# Patient Record
Sex: FEMALE | Race: WHITE | NOT HISPANIC OR LATINO | Employment: FULL TIME | URBAN - METROPOLITAN AREA
[De-identification: names, ages, dates, MRNs, and addresses within clinical notes are randomized per-mention and may not be internally consistent; named-entity substitution may affect disease eponyms.]

---

## 2017-01-10 ENCOUNTER — TELEPHONE (OUTPATIENT)
Dept: OBSTETRICS AND GYNECOLOGY | Facility: CLINIC | Age: 22
End: 2017-01-10

## 2017-01-10 NOTE — TELEPHONE ENCOUNTER
----- Message from Freya Frias sent at 1/10/2017  2:09 PM CST -----  Contact: self, [phone]  Patient called in requesting to speak with you regarding getting her medical records from her previous doctor.  Please advise.

## 2017-01-10 NOTE — TELEPHONE ENCOUNTER
Pt called stating she is switching to Dr. Wiedemann from Dr. Quintana and she wanted to know if she needed to cancel her appt's with her or if we would do it. Pt notified that she would need to call and cancel them. Pt verbalized understanding

## 2017-01-12 ENCOUNTER — ROUTINE PRENATAL (OUTPATIENT)
Dept: OBSTETRICS AND GYNECOLOGY | Facility: CLINIC | Age: 22
End: 2017-01-12
Payer: COMMERCIAL

## 2017-01-12 VITALS
WEIGHT: 113.13 LBS | DIASTOLIC BLOOD PRESSURE: 74 MMHG | BODY MASS INDEX: 18.25 KG/M2 | SYSTOLIC BLOOD PRESSURE: 102 MMHG

## 2017-01-12 DIAGNOSIS — N91.2 AMENORRHEA: Primary | ICD-10-CM

## 2017-01-12 PROCEDURE — 0502F SUBSEQUENT PRENATAL CARE: CPT | Mod: S$GLB,,, | Performed by: OBSTETRICS & GYNECOLOGY

## 2017-01-12 PROCEDURE — 99999 PR PBB SHADOW E&M-EST. PATIENT-LVL II: CPT | Mod: PBBFAC,,, | Performed by: OBSTETRICS & GYNECOLOGY

## 2017-01-12 NOTE — PROGRESS NOTES
Pt is 10 weeks by lmp, had us last week, results pending, no problems this preg so far  Labs normal  abd soft, viable on bedside us, wi

## 2017-01-19 ENCOUNTER — TELEPHONE (OUTPATIENT)
Dept: OBSTETRICS AND GYNECOLOGY | Facility: CLINIC | Age: 22
End: 2017-01-19

## 2017-01-19 NOTE — TELEPHONE ENCOUNTER
Returned patients call.   Patient notified to use otc sudafed for her sinus. Verbalized understanding.

## 2017-01-19 NOTE — TELEPHONE ENCOUNTER
----- Message from Maile Hull sent at 1/19/2017 12:25 PM CST -----  Patient no. [phone]     Patient returned your call.

## 2017-01-19 NOTE — TELEPHONE ENCOUNTER
----- Message from Gianna Morocho sent at 1/19/2017 11:35 AM CST -----  Contact: [phone]  Patient would like to speak with you regarding headaches

## 2017-02-07 ENCOUNTER — TELEPHONE (OUTPATIENT)
Dept: OBSTETRICS AND GYNECOLOGY | Facility: CLINIC | Age: 22
End: 2017-02-07

## 2017-02-07 NOTE — TELEPHONE ENCOUNTER
Pt states she has a cold and she wants to know what she can take. Pt advised to take tylenol cold/sinus, robitussin DM and use chloraseptic spray as needed. Pt states she does not have fever. Pt advised to call us back if she gets a fever. Pt verbalized understanding

## 2017-02-07 NOTE — TELEPHONE ENCOUNTER
----- Message from Chuyita Mercedes sent at 2/7/2017  8:31 AM CST -----  Contact: Self [phone]  Patient is calling to talk to nurse she is coming down with a cold and she is 14 weeks pregnant and she has been taking sudafed and is not helping and would like doctors advice.

## 2017-02-13 ENCOUNTER — ROUTINE PRENATAL (OUTPATIENT)
Dept: OBSTETRICS AND GYNECOLOGY | Facility: CLINIC | Age: 22
End: 2017-02-13
Payer: COMMERCIAL

## 2017-02-13 VITALS — DIASTOLIC BLOOD PRESSURE: 62 MMHG | WEIGHT: 121.5 LBS | SYSTOLIC BLOOD PRESSURE: 108 MMHG | BODY MASS INDEX: 19.61 KG/M2

## 2017-02-13 DIAGNOSIS — N91.2 AMENORRHEA: Primary | ICD-10-CM

## 2017-02-13 PROCEDURE — 99999 PR PBB SHADOW E&M-EST. PATIENT-LVL II: CPT | Mod: PBBFAC,,, | Performed by: OBSTETRICS & GYNECOLOGY

## 2017-02-13 PROCEDURE — 0502F SUBSEQUENT PRENATAL CARE: CPT | Mod: S$GLB,,, | Performed by: OBSTETRICS & GYNECOLOGY

## 2017-02-20 ENCOUNTER — TELEPHONE (OUTPATIENT)
Dept: OBSTETRICS AND GYNECOLOGY | Facility: CLINIC | Age: 22
End: 2017-02-20

## 2017-02-20 ENCOUNTER — LAB VISIT (OUTPATIENT)
Dept: LAB | Facility: HOSPITAL | Age: 22
End: 2017-02-20
Attending: OBSTETRICS & GYNECOLOGY
Payer: COMMERCIAL

## 2017-02-20 DIAGNOSIS — Z34.90 PREGNANCY, UNSPECIFIED GESTATIONAL AGE: ICD-10-CM

## 2017-02-20 DIAGNOSIS — Z34.90 PREGNANCY, UNSPECIFIED GESTATIONAL AGE: Primary | ICD-10-CM

## 2017-02-20 LAB — GLUCOSE SERPL-MCNC: 81 MG/DL

## 2017-02-20 PROCEDURE — 36415 COLL VENOUS BLD VENIPUNCTURE: CPT

## 2017-02-20 PROCEDURE — 82950 GLUCOSE TEST: CPT

## 2017-02-20 NOTE — TELEPHONE ENCOUNTER
----- Message from Chuyita Mercedes sent at 2/20/2017  1:43 PM CST -----  Contact: Self [phone]  Patient is downstairs and would like the orders for her glucose test she would like to do it today. Please advice

## 2017-02-21 ENCOUNTER — TELEPHONE (OUTPATIENT)
Dept: OBSTETRICS AND GYNECOLOGY | Facility: CLINIC | Age: 22
End: 2017-02-21

## 2017-02-21 NOTE — TELEPHONE ENCOUNTER
----- Message from Sherrie Brown sent at 2/21/2017  1:45 PM CST -----  Contact: [phone]/makayla with [facility]   Ms Honeycuttderrick its requesting to speak to a nurse in regarding this pt. Please advise

## 2017-03-06 ENCOUNTER — TELEPHONE (OUTPATIENT)
Dept: OBSTETRICS AND GYNECOLOGY | Facility: CLINIC | Age: 22
End: 2017-03-06

## 2017-03-06 NOTE — TELEPHONE ENCOUNTER
Pt states that she was at [facility] Friday and she got faint and lightheaded and then blacked out for a minute. Pt states that this used to happen often and she was told she is hypoglycemic. Pt did not fall, she sat down right before the episode. She wants to know if she needs to do anything or get checked.

## 2017-03-06 NOTE — TELEPHONE ENCOUNTER
----- Message from Gianna Morocho sent at 3/6/2017  9:31 AM CST -----  Contact: [phone]  Patient is requesting is to speak with the nurse, patient did not elaborate

## 2017-03-13 ENCOUNTER — ROUTINE PRENATAL (OUTPATIENT)
Dept: OBSTETRICS AND GYNECOLOGY | Facility: CLINIC | Age: 22
End: 2017-03-13
Payer: COMMERCIAL

## 2017-03-13 ENCOUNTER — OFFICE VISIT (OUTPATIENT)
Dept: OBSTETRICS AND GYNECOLOGY | Facility: CLINIC | Age: 22
End: 2017-03-13
Payer: COMMERCIAL

## 2017-03-13 VITALS
DIASTOLIC BLOOD PRESSURE: 62 MMHG | SYSTOLIC BLOOD PRESSURE: 100 MMHG | WEIGHT: 128.31 LBS | BODY MASS INDEX: 20.71 KG/M2

## 2017-03-13 DIAGNOSIS — Z36.3 ANTENATAL SCREENING FOR MALFORMATION USING ULTRASONICS: Primary | ICD-10-CM

## 2017-03-13 DIAGNOSIS — N91.2 AMENORRHEA: Primary | ICD-10-CM

## 2017-03-13 PROCEDURE — 0502F SUBSEQUENT PRENATAL CARE: CPT | Mod: S$GLB,,, | Performed by: OBSTETRICS & GYNECOLOGY

## 2017-03-13 PROCEDURE — 76805 OB US >/= 14 WKS SNGL FETUS: CPT | Mod: S$GLB,,, | Performed by: OBSTETRICS & GYNECOLOGY

## 2017-03-13 PROCEDURE — 99999 PR PBB SHADOW E&M-EST. PATIENT-LVL II: CPT | Mod: PBBFAC,,, | Performed by: OBSTETRICS & GYNECOLOGY

## 2017-04-13 ENCOUNTER — ROUTINE PRENATAL (OUTPATIENT)
Dept: OBSTETRICS AND GYNECOLOGY | Facility: CLINIC | Age: 22
End: 2017-04-13
Payer: COMMERCIAL

## 2017-04-13 ENCOUNTER — LAB VISIT (OUTPATIENT)
Dept: LAB | Facility: HOSPITAL | Age: 22
End: 2017-04-13
Attending: OBSTETRICS & GYNECOLOGY
Payer: COMMERCIAL

## 2017-04-13 VITALS — WEIGHT: 136.88 LBS | BODY MASS INDEX: 22.1 KG/M2

## 2017-04-13 DIAGNOSIS — N91.2 AMENORRHEA: Primary | ICD-10-CM

## 2017-04-13 DIAGNOSIS — N91.2 AMENORRHEA: ICD-10-CM

## 2017-04-13 PROCEDURE — 81220 CFTR GENE COM VARIANTS: CPT

## 2017-04-13 PROCEDURE — 36415 COLL VENOUS BLD VENIPUNCTURE: CPT

## 2017-04-13 PROCEDURE — 0502F SUBSEQUENT PRENATAL CARE: CPT | Mod: S$GLB,,, | Performed by: OBSTETRICS & GYNECOLOGY

## 2017-04-13 PROCEDURE — 99999 PR PBB SHADOW E&M-EST. PATIENT-LVL I: CPT | Mod: PBBFAC,,, | Performed by: OBSTETRICS & GYNECOLOGY

## 2017-04-17 LAB — CFTR MUT ANL BLD/T: NORMAL

## 2017-05-11 ENCOUNTER — ROUTINE PRENATAL (OUTPATIENT)
Dept: OBSTETRICS AND GYNECOLOGY | Facility: CLINIC | Age: 22
End: 2017-05-11
Payer: COMMERCIAL

## 2017-05-11 VITALS — DIASTOLIC BLOOD PRESSURE: 70 MMHG | WEIGHT: 140.63 LBS | BODY MASS INDEX: 22.7 KG/M2 | SYSTOLIC BLOOD PRESSURE: 116 MMHG

## 2017-05-11 DIAGNOSIS — N91.2 AMENORRHEA: Primary | ICD-10-CM

## 2017-05-11 PROCEDURE — 0502F SUBSEQUENT PRENATAL CARE: CPT | Mod: S$GLB,,, | Performed by: OBSTETRICS & GYNECOLOGY

## 2017-05-11 PROCEDURE — 99999 PR PBB SHADOW E&M-EST. PATIENT-LVL II: CPT | Mod: PBBFAC,,, | Performed by: OBSTETRICS & GYNECOLOGY

## 2017-05-18 ENCOUNTER — TELEPHONE (OUTPATIENT)
Dept: OBSTETRICS AND GYNECOLOGY | Facility: CLINIC | Age: 22
End: 2017-05-18

## 2017-05-18 NOTE — TELEPHONE ENCOUNTER
Returned patients call.   Notified patient office will mail information as requested. Patient verbalized understanding.

## 2017-05-18 NOTE — TELEPHONE ENCOUNTER
----- Message from Sis Machuca sent at 5/18/2017 10:45 AM CDT -----  Contact: Self/[phone]  Patient said she is sending a Physician Statement for her maternity leave for you to fill out and send to address provided. Please advise

## 2017-06-08 ENCOUNTER — ROUTINE PRENATAL (OUTPATIENT)
Dept: OBSTETRICS AND GYNECOLOGY | Facility: CLINIC | Age: 22
End: 2017-06-08
Payer: COMMERCIAL

## 2017-06-08 ENCOUNTER — LAB VISIT (OUTPATIENT)
Dept: LAB | Facility: HOSPITAL | Age: 22
End: 2017-06-08
Attending: OBSTETRICS & GYNECOLOGY
Payer: COMMERCIAL

## 2017-06-08 VITALS
WEIGHT: 148.81 LBS | SYSTOLIC BLOOD PRESSURE: 104 MMHG | BODY MASS INDEX: 24.02 KG/M2 | DIASTOLIC BLOOD PRESSURE: 62 MMHG

## 2017-06-08 DIAGNOSIS — N91.2 AMENORRHEA: ICD-10-CM

## 2017-06-08 DIAGNOSIS — N91.2 AMENORRHEA: Primary | ICD-10-CM

## 2017-06-08 LAB — GLUCOSE SERPL-MCNC: 133 MG/DL

## 2017-06-08 PROCEDURE — 36415 COLL VENOUS BLD VENIPUNCTURE: CPT

## 2017-06-08 PROCEDURE — 99999 PR PBB SHADOW E&M-EST. PATIENT-LVL II: CPT | Mod: PBBFAC,,, | Performed by: OBSTETRICS & GYNECOLOGY

## 2017-06-08 PROCEDURE — 0502F SUBSEQUENT PRENATAL CARE: CPT | Mod: S$GLB,,, | Performed by: OBSTETRICS & GYNECOLOGY

## 2017-06-08 PROCEDURE — 82950 GLUCOSE TEST: CPT

## 2017-06-08 RX ORDER — CLOTRIMAZOLE AND BETAMETHASONE DIPROPIONATE 10; .64 MG/G; MG/G
CREAM TOPICAL 2 TIMES DAILY
Qty: 15 TUBE | Refills: 1 | Status: SHIPPED | OUTPATIENT
Start: 2017-06-08 | End: 2017-07-10

## 2017-06-22 ENCOUNTER — ROUTINE PRENATAL (OUTPATIENT)
Dept: OBSTETRICS AND GYNECOLOGY | Facility: CLINIC | Age: 22
End: 2017-06-22
Payer: COMMERCIAL

## 2017-06-22 VITALS — WEIGHT: 149.94 LBS | BODY MASS INDEX: 24.2 KG/M2 | SYSTOLIC BLOOD PRESSURE: 102 MMHG | DIASTOLIC BLOOD PRESSURE: 58 MMHG

## 2017-06-22 DIAGNOSIS — N91.2 AMENORRHEA: Primary | ICD-10-CM

## 2017-06-22 DIAGNOSIS — R30.0 DYSURIA: ICD-10-CM

## 2017-06-22 DIAGNOSIS — Z36.89 ENCOUNTER FOR ULTRASOUND TO CHECK FETAL GROWTH: ICD-10-CM

## 2017-06-22 PROCEDURE — 87088 URINE BACTERIA CULTURE: CPT

## 2017-06-22 PROCEDURE — 87077 CULTURE AEROBIC IDENTIFY: CPT

## 2017-06-22 PROCEDURE — 87086 URINE CULTURE/COLONY COUNT: CPT

## 2017-06-22 PROCEDURE — 0502F SUBSEQUENT PRENATAL CARE: CPT | Mod: S$GLB,,, | Performed by: OBSTETRICS & GYNECOLOGY

## 2017-06-22 PROCEDURE — 87186 SC STD MICRODIL/AGAR DIL: CPT

## 2017-06-22 PROCEDURE — 99999 PR PBB SHADOW E&M-EST. PATIENT-LVL II: CPT | Mod: PBBFAC,,, | Performed by: OBSTETRICS & GYNECOLOGY

## 2017-06-24 ENCOUNTER — HOSPITAL ENCOUNTER (OUTPATIENT)
Facility: HOSPITAL | Age: 22
Discharge: HOME | End: 2017-06-25
Attending: OBSTETRICS & GYNECOLOGY | Admitting: OBSTETRICS & GYNECOLOGY
Payer: COMMERCIAL

## 2017-06-24 DIAGNOSIS — N12 PYELONEPHRITIS: Primary | ICD-10-CM

## 2017-06-24 DIAGNOSIS — R10.9 LEFT SIDED ABDOMINAL PAIN: ICD-10-CM

## 2017-06-24 LAB
ALBUMIN SERPL BCP-MCNC: 2.6 G/DL
ALP SERPL-CCNC: 74 U/L
ALT SERPL W/O P-5'-P-CCNC: 11 U/L
ANION GAP SERPL CALC-SCNC: 10 MMOL/L
AST SERPL-CCNC: 14 U/L
BACTERIA #/AREA URNS HPF: ABNORMAL /HPF
BASOPHILS # BLD AUTO: 0.03 K/UL
BASOPHILS NFR BLD: 0.2 %
BILIRUB SERPL-MCNC: 0.4 MG/DL
BILIRUB UR QL STRIP: NEGATIVE
BUN SERPL-MCNC: 9 MG/DL
CALCIUM SERPL-MCNC: 8.6 MG/DL
CHLORIDE SERPL-SCNC: 107 MMOL/L
CLARITY UR: CLEAR
CO2 SERPL-SCNC: 20 MMOL/L
COLOR UR: YELLOW
CREAT SERPL-MCNC: 0.7 MG/DL
DIFFERENTIAL METHOD: ABNORMAL
EOSINOPHIL # BLD AUTO: 0 K/UL
EOSINOPHIL NFR BLD: 0.1 %
ERYTHROCYTE [DISTWIDTH] IN BLOOD BY AUTOMATED COUNT: 12.8 %
EST. GFR  (AFRICAN AMERICAN): >60 ML/MIN/1.73 M^2
EST. GFR  (NON AFRICAN AMERICAN): >60 ML/MIN/1.73 M^2
GLUCOSE SERPL-MCNC: 101 MG/DL
GLUCOSE UR QL STRIP: NEGATIVE
HCT VFR BLD AUTO: 29.6 %
HGB BLD-MCNC: 10.3 G/DL
HGB UR QL STRIP: ABNORMAL
KETONES UR QL STRIP: ABNORMAL
LEUKOCYTE ESTERASE UR QL STRIP: ABNORMAL
LYMPHOCYTES # BLD AUTO: 0.8 K/UL
LYMPHOCYTES NFR BLD: 5.8 %
MCH RBC QN AUTO: 31.9 PG
MCHC RBC AUTO-ENTMCNC: 34.8 %
MCV RBC AUTO: 92 FL
MICROSCOPIC COMMENT: ABNORMAL
MONOCYTES # BLD AUTO: 1.7 K/UL
MONOCYTES NFR BLD: 11.9 %
NEUTROPHILS # BLD AUTO: 11.5 K/UL
NEUTROPHILS NFR BLD: 80.4 %
NITRITE UR QL STRIP: NEGATIVE
PH UR STRIP: 7 [PH] (ref 5–8)
PLATELET # BLD AUTO: 135 K/UL
PMV BLD AUTO: 10 FL
POTASSIUM SERPL-SCNC: 3.8 MMOL/L
PROT SERPL-MCNC: 5.7 G/DL
PROT UR QL STRIP: ABNORMAL
RBC # BLD AUTO: 3.23 M/UL
RBC #/AREA URNS HPF: 40 /HPF (ref 0–4)
SODIUM SERPL-SCNC: 137 MMOL/L
SP GR UR STRIP: 1.01 (ref 1–1.03)
SQUAMOUS #/AREA URNS HPF: 2 /HPF
URN SPEC COLLECT METH UR: ABNORMAL
UROBILINOGEN UR STRIP-ACNC: NEGATIVE EU/DL
WBC # BLD AUTO: 14.32 K/UL
WBC #/AREA URNS HPF: 3 /HPF (ref 0–5)

## 2017-06-24 PROCEDURE — 63600175 PHARM REV CODE 636 W HCPCS: Performed by: OBSTETRICS & GYNECOLOGY

## 2017-06-24 PROCEDURE — 96361 HYDRATE IV INFUSION ADD-ON: CPT

## 2017-06-24 PROCEDURE — 59025 FETAL NON-STRESS TEST: CPT | Mod: 26,,, | Performed by: OBSTETRICS & GYNECOLOGY

## 2017-06-24 PROCEDURE — 36415 COLL VENOUS BLD VENIPUNCTURE: CPT

## 2017-06-24 PROCEDURE — 80053 COMPREHEN METABOLIC PANEL: CPT

## 2017-06-24 PROCEDURE — G0378 HOSPITAL OBSERVATION PER HR: HCPCS

## 2017-06-24 PROCEDURE — 96376 TX/PRO/DX INJ SAME DRUG ADON: CPT

## 2017-06-24 PROCEDURE — 59025 FETAL NON-STRESS TEST: CPT

## 2017-06-24 PROCEDURE — 85025 COMPLETE CBC W/AUTO DIFF WBC: CPT

## 2017-06-24 PROCEDURE — 96374 THER/PROPH/DIAG INJ IV PUSH: CPT

## 2017-06-24 PROCEDURE — 25000003 PHARM REV CODE 250: Performed by: OBSTETRICS & GYNECOLOGY

## 2017-06-24 PROCEDURE — 25000003 PHARM REV CODE 250

## 2017-06-24 PROCEDURE — 81000 URINALYSIS NONAUTO W/SCOPE: CPT

## 2017-06-24 PROCEDURE — 99214 OFFICE O/P EST MOD 30 MIN: CPT | Mod: 25,,, | Performed by: OBSTETRICS & GYNECOLOGY

## 2017-06-24 RX ORDER — ONDANSETRON 8 MG/1
8 TABLET, ORALLY DISINTEGRATING ORAL EVERY 8 HOURS PRN
Status: DISCONTINUED | OUTPATIENT
Start: 2017-06-24 | End: 2017-06-25 | Stop reason: HOSPADM

## 2017-06-24 RX ORDER — CEFAZOLIN SODIUM 1 G/50ML
1 SOLUTION INTRAVENOUS
Status: DISCONTINUED | OUTPATIENT
Start: 2017-06-24 | End: 2017-06-25 | Stop reason: HOSPADM

## 2017-06-24 RX ORDER — SODIUM CHLORIDE, SODIUM LACTATE, POTASSIUM CHLORIDE, CALCIUM CHLORIDE 600; 310; 30; 20 MG/100ML; MG/100ML; MG/100ML; MG/100ML
INJECTION, SOLUTION INTRAVENOUS CONTINUOUS
Status: DISCONTINUED | OUTPATIENT
Start: 2017-06-24 | End: 2017-06-25 | Stop reason: HOSPADM

## 2017-06-24 RX ORDER — ACETAMINOPHEN 500 MG
500 TABLET ORAL EVERY 6 HOURS PRN
Status: DISCONTINUED | OUTPATIENT
Start: 2017-06-24 | End: 2017-06-25 | Stop reason: HOSPADM

## 2017-06-24 RX ORDER — OXYCODONE AND ACETAMINOPHEN 10; 325 MG/1; MG/1
1 TABLET ORAL ONCE AS NEEDED
Status: COMPLETED | OUTPATIENT
Start: 2017-06-24 | End: 2017-06-24

## 2017-06-24 RX ORDER — OXYCODONE AND ACETAMINOPHEN 5; 325 MG/1; MG/1
1 TABLET ORAL EVERY 6 HOURS PRN
Status: DISCONTINUED | OUTPATIENT
Start: 2017-06-24 | End: 2017-06-25 | Stop reason: HOSPADM

## 2017-06-24 RX ORDER — CEFAZOLIN SODIUM 1 G/50ML
1 SOLUTION INTRAVENOUS ONCE
Status: COMPLETED | OUTPATIENT
Start: 2017-06-24 | End: 2017-06-24

## 2017-06-24 RX ORDER — OXYCODONE AND ACETAMINOPHEN 5; 325 MG/1; MG/1
TABLET ORAL
Status: COMPLETED
Start: 2017-06-24 | End: 2017-06-24

## 2017-06-24 RX ADMIN — ACETAMINOPHEN 500 MG: 500 TABLET ORAL at 04:06

## 2017-06-24 RX ADMIN — CEFAZOLIN SODIUM 1 G: 1 SOLUTION INTRAVENOUS at 07:06

## 2017-06-24 RX ADMIN — SODIUM CHLORIDE, SODIUM LACTATE, POTASSIUM CHLORIDE, AND CALCIUM CHLORIDE: .6; .31; .03; .02 INJECTION, SOLUTION INTRAVENOUS at 06:06

## 2017-06-24 RX ADMIN — CEFAZOLIN SODIUM 1 G: 1 SOLUTION INTRAVENOUS at 01:06

## 2017-06-24 RX ADMIN — SODIUM CHLORIDE, SODIUM LACTATE, POTASSIUM CHLORIDE, AND CALCIUM CHLORIDE 1000 ML: .6; .31; .03; .02 INJECTION, SOLUTION INTRAVENOUS at 04:06

## 2017-06-24 RX ADMIN — SODIUM CHLORIDE, SODIUM LACTATE, POTASSIUM CHLORIDE, AND CALCIUM CHLORIDE: .6; .31; .03; .02 INJECTION, SOLUTION INTRAVENOUS at 01:06

## 2017-06-24 RX ADMIN — OXYCODONE HYDROCHLORIDE AND ACETAMINOPHEN 1 TABLET: 5; 325 TABLET ORAL at 07:06

## 2017-06-24 RX ADMIN — OXYCODONE HYDROCHLORIDE AND ACETAMINOPHEN 1 TABLET: 10; 325 TABLET ORAL at 06:06

## 2017-06-24 RX ADMIN — CEFAZOLIN 1 G: 330 INJECTION, POWDER, FOR SOLUTION INTRAMUSCULAR; INTRAVENOUS at 06:06

## 2017-06-24 RX ADMIN — ONDANSETRON 8 MG: 8 TABLET, ORALLY DISINTEGRATING ORAL at 07:06

## 2017-06-24 RX ADMIN — ONDANSETRON 8 MG: 8 TABLET, ORALLY DISINTEGRATING ORAL at 06:06

## 2017-06-24 RX ADMIN — OXYCODONE HYDROCHLORIDE AND ACETAMINOPHEN 1 TABLET: 5; 325 TABLET ORAL at 11:06

## 2017-06-24 NOTE — H&P
Admit HP     Pt is 21 presents with L flank discomfort and fever, baby moving well, no lof or bleeding, no contractions. Pt has urine culture pending from Thursday, symptoms worsened.  Uncomplicated prenatal care with me, labs all fine.   PMH neg (pt states she had kidney infection 1-2 years ago)  PSH neg  Sh neg  Ob hx G1  Pe Vss, low temp  Gen pt appears in no distress, smiling  Head/neck nromal, perrla, supple  Chest clear  abd gravid, uterus and abdomen nontender, no masses  Soft without tenderness  Mild L cva tenderness, not acute  cx closed per rn  extr nromal, neurovascular intact, no edema    nst reactive with accelerations per protocol, no decesl orlabor  Labd, ua pos for uti  Other blood normal  Assessment   iup 33+ weeks, no labor  Uti, prob early pyelo  Plan, admit for iv antibiotics/monitoring

## 2017-06-25 VITALS
BODY MASS INDEX: 24.11 KG/M2 | SYSTOLIC BLOOD PRESSURE: 109 MMHG | TEMPERATURE: 98 F | HEIGHT: 66 IN | WEIGHT: 150 LBS | OXYGEN SATURATION: 99 % | DIASTOLIC BLOOD PRESSURE: 69 MMHG | RESPIRATION RATE: 20 BRPM | HEART RATE: 108 BPM

## 2017-06-25 PROBLEM — N12 PYELONEPHRITIS: Status: ACTIVE | Noted: 2017-06-25

## 2017-06-25 LAB
BASOPHILS # BLD AUTO: 0.02 K/UL
BASOPHILS NFR BLD: 0.2 %
DIFFERENTIAL METHOD: ABNORMAL
EOSINOPHIL # BLD AUTO: 0 K/UL
EOSINOPHIL NFR BLD: 0.1 %
ERYTHROCYTE [DISTWIDTH] IN BLOOD BY AUTOMATED COUNT: 12.6 %
HCT VFR BLD AUTO: 30.4 %
HGB BLD-MCNC: 10.4 G/DL
LYMPHOCYTES # BLD AUTO: 1.3 K/UL
LYMPHOCYTES NFR BLD: 9.9 %
MCH RBC QN AUTO: 31.6 PG
MCHC RBC AUTO-ENTMCNC: 34.2 %
MCV RBC AUTO: 92 FL
MONOCYTES # BLD AUTO: 1.3 K/UL
MONOCYTES NFR BLD: 10.1 %
NEUTROPHILS # BLD AUTO: 10.4 K/UL
NEUTROPHILS NFR BLD: 78 %
PLATELET # BLD AUTO: 136 K/UL
PMV BLD AUTO: 10.2 FL
RBC # BLD AUTO: 3.29 M/UL
WBC # BLD AUTO: 13.33 K/UL

## 2017-06-25 PROCEDURE — 63600175 PHARM REV CODE 636 W HCPCS: Performed by: OBSTETRICS & GYNECOLOGY

## 2017-06-25 PROCEDURE — 96375 TX/PRO/DX INJ NEW DRUG ADDON: CPT

## 2017-06-25 PROCEDURE — 99217 PR OBSERVATION CARE DISCHARGE: CPT | Mod: 25,,, | Performed by: OBSTETRICS & GYNECOLOGY

## 2017-06-25 PROCEDURE — 36415 COLL VENOUS BLD VENIPUNCTURE: CPT

## 2017-06-25 PROCEDURE — 96361 HYDRATE IV INFUSION ADD-ON: CPT

## 2017-06-25 PROCEDURE — 85025 COMPLETE CBC W/AUTO DIFF WBC: CPT

## 2017-06-25 PROCEDURE — 25000003 PHARM REV CODE 250: Performed by: OBSTETRICS & GYNECOLOGY

## 2017-06-25 PROCEDURE — 96366 THER/PROPH/DIAG IV INF ADDON: CPT

## 2017-06-25 PROCEDURE — 96360 HYDRATION IV INFUSION INIT: CPT

## 2017-06-25 PROCEDURE — 96376 TX/PRO/DX INJ SAME DRUG ADON: CPT

## 2017-06-25 PROCEDURE — 96368 THER/DIAG CONCURRENT INF: CPT

## 2017-06-25 RX ADMIN — ACETAMINOPHEN 500 MG: 500 TABLET ORAL at 12:06

## 2017-06-25 RX ADMIN — CEFAZOLIN SODIUM 1 G: 1 SOLUTION INTRAVENOUS at 06:06

## 2017-06-25 RX ADMIN — CEFAZOLIN SODIUM 1 G: 1 SOLUTION INTRAVENOUS at 05:06

## 2017-06-25 RX ADMIN — CEFAZOLIN SODIUM 1 G: 1 SOLUTION INTRAVENOUS at 12:06

## 2017-06-25 RX ADMIN — ACETAMINOPHEN 500 MG: 500 TABLET ORAL at 06:06

## 2017-06-25 RX ADMIN — ONDANSETRON 8 MG: 8 TABLET, ORALLY DISINTEGRATING ORAL at 06:06

## 2017-06-25 NOTE — DISCHARGE INSTRUCTIONS
Discharge home in stable condition. Dr. Wiedemann will call in AM to check on patient and prescribe oral antibiotics.

## 2017-06-25 NOTE — PROGRESS NOTES
Pt remains afebrile, feels much better, wants dc to home  Will give another dose iv ancef, then dc,   rx oral to get in am,

## 2017-06-25 NOTE — PROGRESS NOTES
Hd 2 for pyelo, L side,   Afebrile last pm, low temp now,  Still with discomfort L cva, otherwise ok  abd soft,   fht good, nst reactive with accelerations per protocol  Assessment;  Pyelonephritis  Plan, awaiting senstivities, plan on possible dc later on oral if remains afebrile, ??  Will re-evaluate later

## 2017-06-26 ENCOUNTER — TELEPHONE (OUTPATIENT)
Dept: OBSTETRICS AND GYNECOLOGY | Facility: CLINIC | Age: 22
End: 2017-06-26

## 2017-06-26 LAB — BACTERIA UR CULT: NORMAL

## 2017-06-26 RX ORDER — BUTALBITAL, ACETAMINOPHEN AND CAFFEINE 50; 325; 40 MG/1; MG/1; MG/1
1 TABLET ORAL EVERY 4 HOURS PRN
Qty: 15 TABLET | Refills: 0 | Status: SHIPPED | OUTPATIENT
Start: 2017-06-26 | End: 2017-07-10

## 2017-06-26 RX ORDER — BUTALBITAL, ACETAMINOPHEN AND CAFFEINE 50; 325; 40 MG/1; MG/1; MG/1
1 TABLET ORAL EVERY 4 HOURS PRN
Qty: 15 TABLET | Refills: 0 | Status: SHIPPED | OUTPATIENT
Start: 2017-06-26 | End: 2017-06-26 | Stop reason: SDUPTHER

## 2017-06-26 RX ORDER — CEPHALEXIN 500 MG/1
500 CAPSULE ORAL 4 TIMES DAILY
Qty: 20 CAPSULE | Refills: 1 | Status: SHIPPED | OUTPATIENT
Start: 2017-06-26 | End: 2017-07-06

## 2017-06-26 RX ORDER — CEPHALEXIN 500 MG/1
500 CAPSULE ORAL 4 TIMES DAILY
Qty: 20 CAPSULE | Refills: 1 | Status: SHIPPED | OUTPATIENT
Start: 2017-06-26 | End: 2017-06-26 | Stop reason: SDUPTHER

## 2017-06-26 NOTE — TELEPHONE ENCOUNTER
----- Message from Tuyet Schmitz sent at 6/26/2017  8:18 AM CDT -----  Contact: self/[phone]  She needs to speak to the doctor concerning a headache relating to her kidney issues.

## 2017-06-26 NOTE — TELEPHONE ENCOUNTER
Pt states she is having an extremely bad headache. She is crying as I am talking to her. The headache starts at the base of the skull on the L side. She says she can barely move her head.

## 2017-06-27 ENCOUNTER — TELEPHONE (OUTPATIENT)
Dept: OBSTETRICS AND GYNECOLOGY | Facility: CLINIC | Age: 22
End: 2017-06-27

## 2017-06-27 RX ORDER — NITROFURANTOIN 25; 75 MG/1; MG/1
100 CAPSULE ORAL 2 TIMES DAILY
Qty: 20 CAPSULE | Refills: 1 | Status: SHIPPED | OUTPATIENT
Start: 2017-06-27 | End: 2017-07-10

## 2017-06-27 NOTE — TELEPHONE ENCOUNTER
----- Message from Sherrie Brown sent at 6/27/2017  8:22 AM CDT -----  Contact: [phone]/ self   Patient called in returning your call. Please advise

## 2017-06-27 NOTE — TELEPHONE ENCOUNTER
I sent a nother antibiotic for her to take, ie macrobid, tell her to get this am and start,  Take bid,  (take 3 times today to get started then bid(  Thanks  [facility] pharmacy

## 2017-07-10 ENCOUNTER — OFFICE VISIT (OUTPATIENT)
Dept: OBSTETRICS AND GYNECOLOGY | Facility: CLINIC | Age: 22
End: 2017-07-10
Payer: COMMERCIAL

## 2017-07-10 ENCOUNTER — ROUTINE PRENATAL (OUTPATIENT)
Dept: OBSTETRICS AND GYNECOLOGY | Facility: CLINIC | Age: 22
End: 2017-07-10
Payer: COMMERCIAL

## 2017-07-10 VITALS — DIASTOLIC BLOOD PRESSURE: 68 MMHG | BODY MASS INDEX: 23.81 KG/M2 | WEIGHT: 147.5 LBS | SYSTOLIC BLOOD PRESSURE: 116 MMHG

## 2017-07-10 DIAGNOSIS — O26.849 UTERINE SIZE-DATE DISCREPANCY, ANTEPARTUM, UNSPECIFIED TRIMESTER: Primary | ICD-10-CM

## 2017-07-10 DIAGNOSIS — Z36.89 ENCOUNTER FOR ULTRASOUND TO CHECK FETAL GROWTH: ICD-10-CM

## 2017-07-10 DIAGNOSIS — Z36.85 ANTENATAL SCREENING FOR STREPTOCOCCUS B: Primary | ICD-10-CM

## 2017-07-10 PROCEDURE — 87081 CULTURE SCREEN ONLY: CPT

## 2017-07-10 PROCEDURE — 76816 OB US FOLLOW-UP PER FETUS: CPT | Mod: S$GLB,,, | Performed by: OBSTETRICS & GYNECOLOGY

## 2017-07-10 PROCEDURE — 99999 PR PBB SHADOW E&M-EST. PATIENT-LVL II: CPT | Mod: PBBFAC,,, | Performed by: OBSTETRICS & GYNECOLOGY

## 2017-07-10 PROCEDURE — 0502F SUBSEQUENT PRENATAL CARE: CPT | Mod: S$GLB,,, | Performed by: OBSTETRICS & GYNECOLOGY

## 2017-07-10 NOTE — PROGRESS NOTES
See us, all good, baby active, efw 5-12, feels same, sl less, vtx low, cx closed, strep done  Discussed acog no travel distance after 34 weeks  Make 2 appts,

## 2017-07-13 LAB — BACTERIA SPEC AEROBE CULT: NORMAL

## 2017-07-17 ENCOUNTER — ROUTINE PRENATAL (OUTPATIENT)
Dept: OBSTETRICS AND GYNECOLOGY | Facility: CLINIC | Age: 22
End: 2017-07-17
Payer: COMMERCIAL

## 2017-07-17 VITALS
BODY MASS INDEX: 24.66 KG/M2 | DIASTOLIC BLOOD PRESSURE: 62 MMHG | SYSTOLIC BLOOD PRESSURE: 108 MMHG | WEIGHT: 152.75 LBS

## 2017-07-17 DIAGNOSIS — Z3A.37 37 WEEKS GESTATION OF PREGNANCY: Primary | ICD-10-CM

## 2017-07-17 PROCEDURE — 0502F SUBSEQUENT PRENATAL CARE: CPT | Mod: S$GLB,,, | Performed by: OBSTETRICS & GYNECOLOGY

## 2017-07-17 PROCEDURE — 99999 PR PBB SHADOW E&M-EST. PATIENT-LVL II: CPT | Mod: PBBFAC,,, | Performed by: OBSTETRICS & GYNECOLOGY

## 2017-07-24 ENCOUNTER — HOSPITAL ENCOUNTER (OUTPATIENT)
Facility: HOSPITAL | Age: 22
Discharge: HOME | End: 2017-07-24
Attending: OBSTETRICS & GYNECOLOGY | Admitting: OBSTETRICS & GYNECOLOGY
Payer: COMMERCIAL

## 2017-07-24 VITALS
TEMPERATURE: 98 F | HEART RATE: 66 BPM | WEIGHT: 152 LBS | SYSTOLIC BLOOD PRESSURE: 118 MMHG | DIASTOLIC BLOOD PRESSURE: 79 MMHG | BODY MASS INDEX: 24.43 KG/M2 | HEIGHT: 66 IN | OXYGEN SATURATION: 100 %

## 2017-07-24 DIAGNOSIS — O26.899 ABDOMINAL PAIN COMPLICATING PREGNANCY: ICD-10-CM

## 2017-07-24 DIAGNOSIS — R10.9 ABDOMINAL PAIN COMPLICATING PREGNANCY: ICD-10-CM

## 2017-07-24 PROCEDURE — 99211 OFF/OP EST MAY X REQ PHY/QHP: CPT | Mod: 25

## 2017-07-24 PROCEDURE — 76815 OB US LIMITED FETUS(S): CPT

## 2017-07-24 PROCEDURE — G0378 HOSPITAL OBSERVATION PER HR: HCPCS

## 2017-07-24 PROCEDURE — 59025 FETAL NON-STRESS TEST: CPT | Mod: 26,,, | Performed by: OBSTETRICS & GYNECOLOGY

## 2017-07-24 PROCEDURE — 99213 OFFICE O/P EST LOW 20 MIN: CPT | Mod: 25,,, | Performed by: OBSTETRICS & GYNECOLOGY

## 2017-07-24 NOTE — PROGRESS NOTES
20 y/o G1 @ 38 weeks, presents to LD with poss lof and contractions. No bleeding or pain.  Pt has uncomplicated prenatal care.   Neg nitrozine per rn  Monitor shows irreg few contractions, no labor  cx closed per rn  Reactive nst with accelerations throughout, per protocol    Uterus soft  cx post closed by me  Neg nitro    Assessment, no evidenc lof  Us shows good fluid  Reactive nst, no labor  Plan, dc to home

## 2017-07-24 NOTE — PLAN OF CARE
Pt arrived to unit from ED with complaints of some period like cramping and two incidents yesterday of some leaking of fluid. Pt denies vaginal bleeding. States when she doesn feel contractions, they are still very spaced out. Pt accompanied by significant other. Changing into gown.    0815- dr. Wiedemann at bedside. Assessed pt. Bedside ultrasound completed. md confirmed plenty of amniotic fluid and visualized placenta. md also reviewed strip. sve done, amniotape also used with negative results. Pt and her SO understand that pt will be discharged home in 20 min after monitoring complete.

## 2017-07-25 ENCOUNTER — TELEPHONE (OUTPATIENT)
Dept: OBSTETRICS AND GYNECOLOGY | Facility: CLINIC | Age: 22
End: 2017-07-25

## 2017-07-25 NOTE — TELEPHONE ENCOUNTER
----- Message from Freya Altagracia sent at 7/25/2017  2:57 PM CDT -----  Contact: self, [phone]  Patient called in requesting to speak with you to know if she will need to come in.  Please advise.

## 2017-07-25 NOTE — TELEPHONE ENCOUNTER
Pt states she is feeling okay, she is really tired and sore. The contractions she is having are sporadic. She just wants to know if she needs to come in this week

## 2017-07-26 NOTE — TELEPHONE ENCOUNTER
Saw her Monday, just set up for nst anytime next week, and am going to induce her Monday 7th if not delivered.  :(

## 2017-07-26 NOTE — TELEPHONE ENCOUNTER
Pt has been scheduled for NST. She says today she woke up feeling pretty bad and her contractions are coming more regularly and they are hurting her more than they were before.

## 2017-08-01 ENCOUNTER — HOSPITAL ENCOUNTER (OUTPATIENT)
Dept: OBSTETRICS AND GYNECOLOGY | Facility: HOSPITAL | Age: 22
Discharge: HOME | End: 2017-08-01
Attending: OBSTETRICS & GYNECOLOGY
Payer: COMMERCIAL

## 2017-08-01 ENCOUNTER — TELEPHONE (OUTPATIENT)
Dept: OBSTETRICS AND GYNECOLOGY | Facility: CLINIC | Age: 22
End: 2017-08-01

## 2017-08-01 DIAGNOSIS — O36.8131 DECREASED FETAL MOVEMENT AFFECTING MANAGEMENT OF PREGNANCY IN THIRD TRIMESTER, FETUS 1: Primary | ICD-10-CM

## 2017-08-01 PROCEDURE — 59025 FETAL NON-STRESS TEST: CPT

## 2017-08-01 PROCEDURE — 59025 FETAL NON-STRESS TEST: CPT | Mod: 26,,, | Performed by: OBSTETRICS & GYNECOLOGY

## 2017-08-01 NOTE — TELEPHONE ENCOUNTER
----- Message from Kiesha Verdin sent at 8/1/2017 11:16 AM CDT -----  Contact: [phone]/self  Pt requesting to speak with the nurse concerning her delivery on next week.  Please advise

## 2017-08-03 ENCOUNTER — HOSPITAL ENCOUNTER (OUTPATIENT)
Facility: HOSPITAL | Age: 22
Discharge: HOME | End: 2017-08-03
Attending: OBSTETRICS & GYNECOLOGY | Admitting: OBSTETRICS & GYNECOLOGY
Payer: COMMERCIAL

## 2017-08-03 VITALS
HEART RATE: 70 BPM | TEMPERATURE: 99 F | DIASTOLIC BLOOD PRESSURE: 75 MMHG | RESPIRATION RATE: 20 BRPM | SYSTOLIC BLOOD PRESSURE: 123 MMHG

## 2017-08-03 DIAGNOSIS — R10.9 ABDOMINAL PAIN: ICD-10-CM

## 2017-08-03 PROCEDURE — 99211 OFF/OP EST MAY X REQ PHY/QHP: CPT

## 2017-08-03 PROCEDURE — G0378 HOSPITAL OBSERVATION PER HR: HCPCS

## 2017-08-03 PROCEDURE — 59025 FETAL NON-STRESS TEST: CPT

## 2017-08-03 NOTE — DISCHARGE INSTRUCTIONS
Drink 8-10 glasses water daily/ take prenatal vitamins daily/ eat light small meals frequently/ return if contractions every 3 min for good 2 hrs painful/ or if water bag breaks or if bright red vaginal bleeding/ if no labor before Aug 7 at 3am- come in for scheduled induction of labor

## 2017-08-03 NOTE — PLAN OF CARE
1250- Received to Triage#3 20 yo  at 39.3 weeks with chief c/o off and on pain all night- like menstrual pains. Assisted to bed in hospital gown. Ext FHT and UC monitoring initiated. FHT reassuring. Pt is having off and on mild palpable UC.   1300- SVE=closed soft/ mid-position cervix. No bldy show on exam glove.  1422- Assisted up to BR to void.   1500- SVE=same- closed soft mid position cervix- no bldy show on exam glove. Dr Novoa- on call for Dr Wiedemann notified. Report given. Advised pt can be given the opportunity to ambulate in hospital for 1 hr and get checked again- if no change will be discharged to home or take pain medication and go home now.   1518- Assisted up to ambulate in hallway.  1612- back to bed. Pt reports pain still at 6 out of 10 pain level.   1615- SVE- no change in cervix- closed soft mid-position cervix the patient prepared for discharge to home  1634- off monitoring / discharge instructions given/ pt to return if contractions become stronger and every 3 minutes or if water breaks or if bright red vaginal bleeding. Otherwise come in  for 3am for scheduled induction of labor.Pt and significant other voice understanding..

## 2017-08-04 ENCOUNTER — TELEPHONE (OUTPATIENT)
Dept: OBSTETRICS AND GYNECOLOGY | Facility: CLINIC | Age: 22
End: 2017-08-04

## 2017-08-04 NOTE — TELEPHONE ENCOUNTER
----- Message from Gianna Morocho sent at 8/4/2017  9:55 AM CDT -----  Contact: [phone]  Patient is returning your call

## 2017-08-06 DIAGNOSIS — Z34.93 ENCOUNTER FOR SUPERVISION OF NORMAL PREGNANCY IN THIRD TRIMESTER, UNSPECIFIED GRAVIDITY: Primary | ICD-10-CM

## 2017-08-06 RX ORDER — BUTORPHANOL TARTRATE 1 MG/ML
1 INJECTION INTRAMUSCULAR; INTRAVENOUS
Status: CANCELLED | OUTPATIENT
Start: 2017-08-06

## 2017-08-06 RX ORDER — OXYTOCIN/RINGER'S LACTATE 20/1000 ML
41.65 PLASTIC BAG, INJECTION (ML) INTRAVENOUS CONTINUOUS
Status: CANCELLED | OUTPATIENT
Start: 2017-08-06 | End: 2017-08-06

## 2017-08-06 RX ORDER — SODIUM CHLORIDE, SODIUM LACTATE, POTASSIUM CHLORIDE, CALCIUM CHLORIDE 600; 310; 30; 20 MG/100ML; MG/100ML; MG/100ML; MG/100ML
INJECTION, SOLUTION INTRAVENOUS CONTINUOUS
Status: CANCELLED | OUTPATIENT
Start: 2017-08-06

## 2017-08-06 RX ORDER — ONDANSETRON 4 MG/1
8 TABLET, ORALLY DISINTEGRATING ORAL EVERY 8 HOURS PRN
Status: CANCELLED | OUTPATIENT
Start: 2017-08-06

## 2017-08-06 RX ORDER — CARBOPROST TROMETHAMINE 250 UG/ML
250 INJECTION, SOLUTION INTRAMUSCULAR
Status: CANCELLED | OUTPATIENT
Start: 2017-08-06

## 2017-08-06 RX ORDER — METHYLERGONOVINE MALEATE 0.2 MG/ML
200 INJECTION INTRAVENOUS
Status: CANCELLED | OUTPATIENT
Start: 2017-08-06

## 2017-08-06 RX ORDER — OXYTOCIN/RINGER'S LACTATE 20/1000 ML
2 PLASTIC BAG, INJECTION (ML) INTRAVENOUS CONTINUOUS
Status: CANCELLED | OUTPATIENT
Start: 2017-08-06

## 2017-08-06 RX ORDER — MISOPROSTOL 100 UG/1
200 TABLET ORAL
Status: CANCELLED | OUTPATIENT
Start: 2017-08-06

## 2017-08-06 NOTE — ADDENDUM NOTE
Addended by: WIEDEMANN, MICHAEL A on: 8/6/2017 02:03 PM     Modules accepted: Magdalena, SmartSet

## 2017-08-07 ENCOUNTER — ANESTHESIA EVENT (OUTPATIENT)
Dept: OBSTETRICS AND GYNECOLOGY | Facility: HOSPITAL | Age: 22
End: 2017-08-07
Payer: COMMERCIAL

## 2017-08-07 ENCOUNTER — HOSPITAL ENCOUNTER (INPATIENT)
Facility: HOSPITAL | Age: 22
LOS: 2 days | Discharge: HOME | End: 2017-08-09
Attending: OBSTETRICS & GYNECOLOGY | Admitting: OBSTETRICS & GYNECOLOGY
Payer: COMMERCIAL

## 2017-08-07 ENCOUNTER — ANESTHESIA (OUTPATIENT)
Dept: OBSTETRICS AND GYNECOLOGY | Facility: HOSPITAL | Age: 22
End: 2017-08-07
Payer: COMMERCIAL

## 2017-08-07 DIAGNOSIS — Z34.93 ENCOUNTER FOR SUPERVISION OF NORMAL PREGNANCY IN THIRD TRIMESTER, UNSPECIFIED GRAVIDITY: ICD-10-CM

## 2017-08-07 LAB
ABO + RH BLD: NORMAL
BASOPHILS NFR BLD: 0 %
BLD GP AB SCN CELLS X3 SERPL QL: NORMAL
DIFFERENTIAL METHOD: ABNORMAL
EOSINOPHIL NFR BLD: 1 %
ERYTHROCYTE [DISTWIDTH] IN BLOOD BY AUTOMATED COUNT: 12.8 %
HCT VFR BLD AUTO: 32.2 %
HGB BLD-MCNC: 11.2 G/DL
LYMPHOCYTES NFR BLD: 19 %
MCH RBC QN AUTO: 31.5 PG
MCHC RBC AUTO-ENTMCNC: 34.8 G/DL
MCV RBC AUTO: 90 FL
METAMYELOCYTES NFR BLD MANUAL: 1 %
MONOCYTES NFR BLD: 8 %
NEUTROPHILS NFR BLD: 71 %
PLATELET # BLD AUTO: 179 K/UL
PLATELET BLD QL SMEAR: ABNORMAL
PMV BLD AUTO: 9.9 FL
RBC # BLD AUTO: 3.56 M/UL
RPR SER QL: NORMAL
WBC # BLD AUTO: 12.75 K/UL

## 2017-08-07 PROCEDURE — 85027 COMPLETE CBC AUTOMATED: CPT

## 2017-08-07 PROCEDURE — 86592 SYPHILIS TEST NON-TREP QUAL: CPT

## 2017-08-07 PROCEDURE — 86850 RBC ANTIBODY SCREEN: CPT

## 2017-08-07 PROCEDURE — 85007 BL SMEAR W/DIFF WBC COUNT: CPT

## 2017-08-07 PROCEDURE — 27800516 HC TRAY, EPIDURAL COMBO: Performed by: STUDENT IN AN ORGANIZED HEALTH CARE EDUCATION/TRAINING PROGRAM

## 2017-08-07 PROCEDURE — 25000003 PHARM REV CODE 250: Performed by: STUDENT IN AN ORGANIZED HEALTH CARE EDUCATION/TRAINING PROGRAM

## 2017-08-07 PROCEDURE — 62326 NJX INTERLAMINAR LMBR/SAC: CPT | Performed by: ANESTHESIOLOGY

## 2017-08-07 PROCEDURE — 51702 INSERT TEMP BLADDER CATH: CPT

## 2017-08-07 PROCEDURE — 11000001 HC ACUTE MED/SURG PRIVATE ROOM

## 2017-08-07 PROCEDURE — 72100002 HC LABOR CARE, 1ST 8 HOURS

## 2017-08-07 PROCEDURE — 3E033VJ INTRODUCTION OF OTHER HORMONE INTO PERIPHERAL VEIN, PERCUTANEOUS APPROACH: ICD-10-PCS | Performed by: OBSTETRICS & GYNECOLOGY

## 2017-08-07 PROCEDURE — S0028 INJECTION, FAMOTIDINE, 20 MG: HCPCS | Performed by: STUDENT IN AN ORGANIZED HEALTH CARE EDUCATION/TRAINING PROGRAM

## 2017-08-07 PROCEDURE — 0HQ9XZZ REPAIR PERINEUM SKIN, EXTERNAL APPROACH: ICD-10-PCS | Performed by: OBSTETRICS & GYNECOLOGY

## 2017-08-07 PROCEDURE — 72200005 HC VAGINAL DELIVERY LEVEL II

## 2017-08-07 PROCEDURE — 25000003 PHARM REV CODE 250: Performed by: OBSTETRICS & GYNECOLOGY

## 2017-08-07 PROCEDURE — 36415 COLL VENOUS BLD VENIPUNCTURE: CPT

## 2017-08-07 PROCEDURE — 59400 OBSTETRICAL CARE: CPT | Mod: GB,,, | Performed by: OBSTETRICS & GYNECOLOGY

## 2017-08-07 PROCEDURE — 10907ZC DRAINAGE OF AMNIOTIC FLUID, THERAPEUTIC FROM PRODUCTS OF CONCEPTION, VIA NATURAL OR ARTIFICIAL OPENING: ICD-10-PCS | Performed by: OBSTETRICS & GYNECOLOGY

## 2017-08-07 PROCEDURE — 59025 FETAL NON-STRESS TEST: CPT

## 2017-08-07 PROCEDURE — 27200710 HC EPIDURAL INFUSION PUMP SET: Performed by: STUDENT IN AN ORGANIZED HEALTH CARE EDUCATION/TRAINING PROGRAM

## 2017-08-07 PROCEDURE — 86900 BLOOD TYPING SEROLOGIC ABO: CPT

## 2017-08-07 RX ORDER — FAMOTIDINE 10 MG/ML
INJECTION INTRAVENOUS
Status: DISPENSED
Start: 2017-08-07 | End: 2017-08-07

## 2017-08-07 RX ORDER — ONDANSETRON 8 MG/1
8 TABLET, ORALLY DISINTEGRATING ORAL EVERY 8 HOURS PRN
Status: DISCONTINUED | OUTPATIENT
Start: 2017-08-07 | End: 2017-08-09 | Stop reason: HOSPADM

## 2017-08-07 RX ORDER — BUTORPHANOL TARTRATE 1 MG/ML
1 INJECTION INTRAMUSCULAR; INTRAVENOUS
Status: DISCONTINUED | OUTPATIENT
Start: 2017-08-07 | End: 2017-08-09 | Stop reason: HOSPADM

## 2017-08-07 RX ORDER — DIPHENHYDRAMINE HYDROCHLORIDE 50 MG/ML
12.5 INJECTION INTRAMUSCULAR; INTRAVENOUS EVERY 4 HOURS PRN
Status: CANCELLED | OUTPATIENT
Start: 2017-08-07

## 2017-08-07 RX ORDER — SODIUM CITRATE AND CITRIC ACID MONOHYDRATE 334; 500 MG/5ML; MG/5ML
30 SOLUTION ORAL ONCE AS NEEDED
Status: DISPENSED | OUTPATIENT
Start: 2017-08-07 | End: 2017-08-07

## 2017-08-07 RX ORDER — METHYLERGONOVINE MALEATE 0.2 MG/ML
200 INJECTION INTRAVENOUS
Status: DISCONTINUED | OUTPATIENT
Start: 2017-08-07 | End: 2017-08-09 | Stop reason: HOSPADM

## 2017-08-07 RX ORDER — METOCLOPRAMIDE HYDROCHLORIDE 5 MG/ML
10 INJECTION INTRAMUSCULAR; INTRAVENOUS ONCE AS NEEDED
Status: ACTIVE | OUTPATIENT
Start: 2017-08-07 | End: 2017-08-07

## 2017-08-07 RX ORDER — SODIUM CHLORIDE, SODIUM LACTATE, POTASSIUM CHLORIDE, CALCIUM CHLORIDE 600; 310; 30; 20 MG/100ML; MG/100ML; MG/100ML; MG/100ML
INJECTION, SOLUTION INTRAVENOUS CONTINUOUS
Status: DISCONTINUED | OUTPATIENT
Start: 2017-08-07 | End: 2017-08-09 | Stop reason: HOSPADM

## 2017-08-07 RX ORDER — ZOLPIDEM TARTRATE 5 MG/1
5 TABLET ORAL NIGHTLY PRN
Status: DISCONTINUED | OUTPATIENT
Start: 2017-08-07 | End: 2017-08-09 | Stop reason: HOSPADM

## 2017-08-07 RX ORDER — OXYTOCIN/RINGER'S LACTATE 20/1000 ML
41.65 PLASTIC BAG, INJECTION (ML) INTRAVENOUS CONTINUOUS
Status: ACTIVE | OUTPATIENT
Start: 2017-08-07 | End: 2017-08-07

## 2017-08-07 RX ORDER — OXYCODONE AND ACETAMINOPHEN 10; 325 MG/1; MG/1
1 TABLET ORAL EVERY 4 HOURS PRN
Status: DISCONTINUED | OUTPATIENT
Start: 2017-08-07 | End: 2017-08-09 | Stop reason: HOSPADM

## 2017-08-07 RX ORDER — CARBOPROST TROMETHAMINE 250 UG/ML
250 INJECTION, SOLUTION INTRAMUSCULAR
Status: DISCONTINUED | OUTPATIENT
Start: 2017-08-07 | End: 2017-08-09 | Stop reason: HOSPADM

## 2017-08-07 RX ORDER — OXYCODONE AND ACETAMINOPHEN 5; 325 MG/1; MG/1
1 TABLET ORAL EVERY 4 HOURS PRN
Status: DISCONTINUED | OUTPATIENT
Start: 2017-08-07 | End: 2017-08-09 | Stop reason: HOSPADM

## 2017-08-07 RX ORDER — HYDROCORTISONE 25 MG/G
CREAM TOPICAL 3 TIMES DAILY PRN
Status: DISCONTINUED | OUTPATIENT
Start: 2017-08-07 | End: 2017-08-09 | Stop reason: HOSPADM

## 2017-08-07 RX ORDER — DIPHENHYDRAMINE HCL 25 MG
25 CAPSULE ORAL EVERY 4 HOURS PRN
Status: DISCONTINUED | OUTPATIENT
Start: 2017-08-07 | End: 2017-08-09 | Stop reason: HOSPADM

## 2017-08-07 RX ORDER — SODIUM CITRATE AND CITRIC ACID MONOHYDRATE 334; 500 MG/5ML; MG/5ML
SOLUTION ORAL
Status: DISCONTINUED
Start: 2017-08-07 | End: 2017-08-07 | Stop reason: WASHOUT

## 2017-08-07 RX ORDER — NAPROXEN 500 MG/1
500 TABLET ORAL EVERY 8 HOURS PRN
Status: DISCONTINUED | OUTPATIENT
Start: 2017-08-07 | End: 2017-08-09 | Stop reason: HOSPADM

## 2017-08-07 RX ORDER — ONDANSETRON 2 MG/ML
4 INJECTION INTRAMUSCULAR; INTRAVENOUS ONCE
Status: CANCELLED | OUTPATIENT
Start: 2017-08-07 | End: 2017-08-07

## 2017-08-07 RX ORDER — FAMOTIDINE 10 MG/ML
20 INJECTION INTRAVENOUS ONCE AS NEEDED
Status: COMPLETED | OUTPATIENT
Start: 2017-08-07 | End: 2017-08-07

## 2017-08-07 RX ORDER — MISOPROSTOL 100 MCG
50 TABLET ORAL ONCE
Status: COMPLETED | OUTPATIENT
Start: 2017-08-07 | End: 2017-08-07

## 2017-08-07 RX ORDER — MISOPROSTOL 200 UG/1
200 TABLET ORAL
Status: DISCONTINUED | OUTPATIENT
Start: 2017-08-07 | End: 2017-08-09 | Stop reason: HOSPADM

## 2017-08-07 RX ORDER — OXYTOCIN/RINGER'S LACTATE 20/1000 ML
2 PLASTIC BAG, INJECTION (ML) INTRAVENOUS CONTINUOUS
Status: DISCONTINUED | OUTPATIENT
Start: 2017-08-07 | End: 2017-08-09 | Stop reason: HOSPADM

## 2017-08-07 RX ORDER — DIPHENHYDRAMINE HYDROCHLORIDE 50 MG/ML
25 INJECTION INTRAMUSCULAR; INTRAVENOUS EVERY 4 HOURS PRN
Status: DISCONTINUED | OUTPATIENT
Start: 2017-08-07 | End: 2017-08-09 | Stop reason: HOSPADM

## 2017-08-07 RX ORDER — ACETAMINOPHEN 325 MG/1
650 TABLET ORAL EVERY 6 HOURS PRN
Status: DISCONTINUED | OUTPATIENT
Start: 2017-08-07 | End: 2017-08-09 | Stop reason: HOSPADM

## 2017-08-07 RX ADMIN — Medication 2 MILLI-UNITS/MIN: at 08:08

## 2017-08-07 RX ADMIN — Medication 12 ML/HR: at 08:08

## 2017-08-07 RX ADMIN — NAPROXEN 500 MG: 500 TABLET ORAL at 04:08

## 2017-08-07 RX ADMIN — Medication 50 MCG: at 03:08

## 2017-08-07 RX ADMIN — SODIUM CHLORIDE, SODIUM LACTATE, POTASSIUM CHLORIDE, AND CALCIUM CHLORIDE: .6; .31; .03; .02 INJECTION, SOLUTION INTRAVENOUS at 03:08

## 2017-08-07 RX ADMIN — SODIUM CHLORIDE, SODIUM LACTATE, POTASSIUM CHLORIDE, AND CALCIUM CHLORIDE: .6; .31; .03; .02 INJECTION, SOLUTION INTRAVENOUS at 11:08

## 2017-08-07 RX ADMIN — ONDANSETRON 8 MG: 8 TABLET, ORALLY DISINTEGRATING ORAL at 12:08

## 2017-08-07 RX ADMIN — SODIUM CHLORIDE, SODIUM LACTATE, POTASSIUM CHLORIDE, AND CALCIUM CHLORIDE 1000 ML: .6; .31; .03; .02 INJECTION, SOLUTION INTRAVENOUS at 07:08

## 2017-08-07 RX ADMIN — FAMOTIDINE 20 MG: 10 INJECTION, SOLUTION INTRAVENOUS at 10:08

## 2017-08-07 RX ADMIN — ONDANSETRON 8 MG: 8 TABLET, ORALLY DISINTEGRATING ORAL at 09:08

## 2017-08-07 RX ADMIN — OXYCODONE AND ACETAMINOPHEN 1 TABLET: 5; 325 TABLET ORAL at 09:08

## 2017-08-07 NOTE — H&P
22 y/o G1 at term for induction, uncomplicated prenatal care  pmh neg  Psh neg  All none  Sh neg  pe vss  Head/neck normal  abd gravid  extr normal  cx now 1.5cm 80% effaced  vtx 0 station,  arom very very light mec stain  fht good cat 1  renate 7lbs, plevis feels adeq  Assessment stable  Plan, cont labor, expect vag del

## 2017-08-07 NOTE — PLAN OF CARE
2535- pt arrived on unit from home for scheduled induction.. Denies any pain, bleeding or LOF. EFM and TOCO placed for monitoring. SVE completed 1/30/0. Initial assessment initiated. See flowsheet for completed and continuing assessment. POC initiated and discussed with pt. Medications discussed. Pt states understanding. Denies any questions at present. Family at bedside.   1441- Dr. Wiedemann on unit at bedside

## 2017-08-07 NOTE — PLAN OF CARE
Problem: Patient Care Overview  Goal: Plan of Care Review  Outcome: Ongoing (interventions implemented as appropriate)  Mom will exclusively breastfeed frequently & on cue at least 8+ times/24 hrs.  Will monitor for signs of adequate fdg.  Will call for any needs.

## 2017-08-07 NOTE — ANESTHESIA PROCEDURE NOTES
CSE    Patient location during procedure: OB  Start time: 8/7/2017 8:35 AM  Timeout: 8/7/2017 8:34 AM  End time: 8/7/2017 8:43 AM  Reason for block: at surgeon's request and post-op pain management  Staffing  Anesthesiologist: VIPUL HOWARD  Resident/CRNA: AMBERLY STEELE  Performed: resident/CRNA and anesthesiologist   Preanesthetic Checklist  Completed: patient identified, pre-op evaluation, timeout performed, IV checked, risks and benefits discussed and monitors and equipment checked  CSE  Patient position: sitting  Prep: ChloraPrep  Patient monitoring: heart rate, continuous pulse ox and frequent blood pressure checks  Approach: midline  Spinal Needle  Needle type: Jian   Needle gauge: 25 G  Needle length: 5 in  Epidural Needle  Injection technique: LA air  Needle type: Tuohy   Needle gauge: 17 G  Needle length: 3.5 in  Needle insertion depth: 5.5 cm  Location: L3-4  Needle localization: anatomical landmarks  Catheter  Catheter type: springwFolica  Catheter size: 20 G  Catheter at skin depth: 11 cm  Test dose: lidocaine 2% with Epi 1-to-200,000 and negative  Additional Documentation: negative aspiration for CSF, negative aspiration for heme, no paresthesia on injection and negative test dose  Assessment  Sensory level: T6   Dermatomal levels determined by pinch or prick  Intrathecal Medications:  Bolus administered: 1.5 mL of 0.2 ropivacaine  administered: 5 mcg of  fentanyl  Epinephrine added: none

## 2017-08-07 NOTE — BRIEF OP NOTE
Pt complete plus 2, 2 pushes,  of healthy female, clear fluid at delivery, nuchal cord x 2 reduced, no episiotomy, placenta and cervix appear intace, ebl 200cc,   2-0 chromic repair of midline tear 2cm first degree and Left inside labial tear 4 interuppted, no complications,   Delivery aug 7

## 2017-08-07 NOTE — ANESTHESIA PREPROCEDURE EVALUATION
08/07/2017  Romana Hilario is a 21 y.o., female.    Anesthesia Evaluation    I have reviewed the Patient Summary Reports.     I have reviewed the Medications.     Review of Systems  Anesthesia Hx:  No problems with previous Anesthesia Denies Hx of Anesthetic complications  History of prior surgery of interest to airway management or planning: Denies Family Hx of Anesthesia complications.   Denies Personal Hx of Anesthesia complications.   Social:  Non-Smoker, No Alcohol Use    Hematology/Oncology:         -- Anemia:   Cardiovascular:  Cardiovascular Normal     Pulmonary:  Pulmonary Normal    Neurological:  Neurology Normal    Endocrine:  Endocrine Normal        Physical Exam  General:  Well nourished    Airway/Jaw/Neck:  Airway Findings: Mouth Opening: Normal General Airway Assessment: Adult  Mallampati: II  Jaw/Neck Findings:  Neck ROM: Normal ROM      Dental:  Dental Findings: In tact   Chest/Lungs:  Chest/Lungs Findings: Clear to auscultation, Normal Respiratory Rate     Heart/Vascular:  Heart Findings: Rate: Normal  Rhythm: Regular Rhythm        Mental Status:  Mental Status Findings:  Cooperative, Alert and Oriented         Anesthesia Plan  Type of Anesthesia, risks & benefits discussed:  Anesthesia Type:  CSE, epidural, general, regional, spinal  Patient's Preference:   Intra-op Monitoring Plan: standard ASA monitors  Intra-op Monitoring Plan Comments:   Post Op Pain Control Plan: multimodal analgesia and per primary service following discharge from PACU  Post Op Pain Control Plan Comments:   Induction:    Beta Blocker:  Patient is not currently on a Beta-Blocker (No further documentation required).       Informed Consent: Patient understands risks and agrees with Anesthesia plan.  Questions answered. Anesthesia consent signed with patient.  ASA Score: 2     Day of Surgery Review of History & Physical:     H&P update referred to the surgeon.         Ready For Surgery From Anesthesia Perspective.

## 2017-08-07 NOTE — LACTATION NOTE
08/07/17 1515   Maternal Infant Assessment   Breast Density Bilateral:;soft   Areola Bilateral:;elastic   Nipple(s) Bilateral:;everted   Infant Assessment   Mouth Size average   Sucking Reflex present   Rooting Reflex present   Swallow Reflex present   Breasts WDL   Breasts WDL WDL   Pain/Comfort Assessments   Pain Assessment Performed Yes       Number Scale   Presence of Pain denies   Location - Side Bilateral   Location nipple(s)   Maternal Infant Feeding   Maternal Preparation breast care   Maternal Emotional State assist needed;relaxed   Infant Positioning cross-cradle   Signs of Milk Transfer infant jaw motion present   Presence of Pain no   Comfort Measures Before/During Feeding infant position adjusted;latch adjusted;suction broken using finger;maternal position adjusted   Breast Milk Supply Volume (ml) (expresses drops of colostrum very easily)   Time Spent (min) 30-60 min   Comfort Measures Following Feeding expressed milk applied   Nipple Shape After Feeding, Right elongated   Latch Assistance yes   Breastfeeding Education adequate infant intake;adequate milk volume;importance of skin-to-skin contact;increasing milk supply;label/storage of breast milk;milk expression, hand   Breastfeeding History   Currently Breastfeeding no   Breastfeeding History no   Infant First Feeding   Infant First Feeding breastfeeding   Length of Breastfeeding Post-Delivery 20   Skin-to-Skin Contact Initiated   Breastfeeding Right Side (min) 20 Min   Feeding Infant   Feeding Readiness Cues eager;hand to mouth movements;rooting   Satiety Cues cessation of sucking;infant releases breast   Feeding Tolerance/Success adequate pause for breath;alert for feeding;coordinated suck;coordinated swallow;strong suck   Effective Latch During Feeding yes   Suck/Swallow Coordination present   Skin-to-Skin Contact During Feeding yes   Lactation Referrals   Lactation Consult Breast/nipple pain;Breastfeeding assessment;Initial  assessment;Knowledge deficit   Lactation Interventions   Attachment Promotion breastfeeding assistance provided;counseling provided;face-to-face positioning promoted;family involvement promoted;privacy provided;skin-to-skin contact encouraged   Breast Care: Breastfeeding milk massaged towards nipple   Breastfeeding Assistance assisted with positioning;feeding cue recognition promoted;feeding on demand promoted;feeding session observed;infant latch-on verified;infant suck/swallow verified;milk expression/pumping;support offered   Maternal Breastfeeding Support encouragement offered;lactation counseling provided;maternal rest encouraged   Latch Promotion positioning assisted;infant moved to breast;suck stimulated with colostrum drop

## 2017-08-07 NOTE — PLAN OF CARE
Problem: Postpartum (Vaginal Delivery) (Adult,Obstetrics,Pediatric)  Goal: Signs and Symptoms of Listed Potential Problems Will be Absent, Minimized or Managed (Postpartum)  Signs and symptoms of listed potential problems will be absent, minimized or managed by discharge/transition of care (reference Postpartum (Vaginal Delivery) (Adult,Obstetrics,Pediatric) CPG).  Outcome: Ongoing (interventions implemented as appropriate)  Pain management options discussed with patient. Patient verbalizes understanding. Pt will report that pain management interventions will relieve pain to satisfactory level.    Educated patient on medication and their therapeutic affects as well as adverse reactions and side effects they may cause. Patient verbalizes understanding of medication.     Pt will be knowledgable about Recovery/Postpartum expectations. Patient educated on pain control, hydration, nutrition,pericare to decrease infection risk, and expected outcomes. Patient verbalizes understanding.

## 2017-08-08 LAB
BASOPHILS # BLD AUTO: 0.02 K/UL
BASOPHILS NFR BLD: 0.1 %
DIFFERENTIAL METHOD: ABNORMAL
EOSINOPHIL # BLD AUTO: 0.1 K/UL
EOSINOPHIL NFR BLD: 0.4 %
ERYTHROCYTE [DISTWIDTH] IN BLOOD BY AUTOMATED COUNT: 12.7 %
HCT VFR BLD AUTO: 30 %
HGB BLD-MCNC: 10.3 G/DL
LYMPHOCYTES # BLD AUTO: 2.4 K/UL
LYMPHOCYTES NFR BLD: 16.7 %
MCH RBC QN AUTO: 31.1 PG
MCHC RBC AUTO-ENTMCNC: 34.3 G/DL
MCV RBC AUTO: 91 FL
MONOCYTES # BLD AUTO: 1.2 K/UL
MONOCYTES NFR BLD: 8.5 %
NEUTROPHILS # BLD AUTO: 10.6 K/UL
NEUTROPHILS NFR BLD: 72.7 %
PLATELET # BLD AUTO: 152 K/UL
PMV BLD AUTO: 10.3 FL
RBC # BLD AUTO: 3.31 M/UL
WBC # BLD AUTO: 14.62 K/UL

## 2017-08-08 PROCEDURE — 85025 COMPLETE CBC W/AUTO DIFF WBC: CPT

## 2017-08-08 PROCEDURE — 11000001 HC ACUTE MED/SURG PRIVATE ROOM

## 2017-08-08 PROCEDURE — 36415 COLL VENOUS BLD VENIPUNCTURE: CPT

## 2017-08-08 PROCEDURE — 25000003 PHARM REV CODE 250: Performed by: OBSTETRICS & GYNECOLOGY

## 2017-08-08 RX ADMIN — NAPROXEN 500 MG: 500 TABLET ORAL at 08:08

## 2017-08-08 RX ADMIN — NAPROXEN 500 MG: 500 TABLET ORAL at 04:08

## 2017-08-08 NOTE — PROGRESS NOTES
Pp day 1 from , healthy female  No co  Vss, pedro luis diet  Abd soft  Fundus firm  extr nromal  Lab stable  Plan, progressive care, prob dc alexsandra  Rx on chart

## 2017-08-08 NOTE — PLAN OF CARE
Problem: Breastfeeding (Adult,Obstetrics,Pediatric)  Goal: Signs and Symptoms of Listed Potential Problems Will be Absent, Minimized or Managed (Breastfeeding)  Signs and symptoms of listed potential problems will be absent, minimized or managed by discharge/transition of care (reference Breastfeeding (Adult,Obstetrics,Pediatric) CPG).   Outcome: Ongoing (interventions implemented as appropriate)  Mother will breastfeed 8 or more times in 24 hours and on cue.  She will do lots of skin to skin before feedings and between feedings.  She will monitor baby for signs of an adequate feeding.  She will call [facility] for any needs or concerns.

## 2017-08-08 NOTE — LACTATION NOTE
08/08/17 1650   Infant Information   Infant's Name Jayy   Maternal Infant Assessment   Breast Size Issue none   Breast Density Bilateral:;soft  (per pt.)   Areola Bilateral:;elastic   Nipple(s) Bilateral:;everted   Nipple Symptoms right:;redness;tender;left:  (right nipp:red on tip/tender,  left: no redness/sl tender)   Infant Assessment   Mouth Size average   Sucking Reflex present  (per pt.)   Rooting Reflex present  (per pt.)   Swallow Reflex present  (per pt.)   Pain/Comfort Assessments   Pain Assessment Performed Yes       Number Scale   Presence of Pain denies  (denies pain to nipples when BF after latch)   Location - Side Bilateral   Location nipple(s)   Pain Rating: Rest 0   Pain Rating: Activity 0   Maternal Infant Feeding   Maternal Preparation breast care;hand hygiene   Maternal Emotional State relaxed   Infant Positioning (mom holding baby, baby sleeping)   Time Spent (min) 15-30 min   Latch Assistance no   Breastfeeding Education adequate infant intake;adequate milk volume;importance of skin-to-skin contact;increasing milk supply;other (see comments)  (s/d,s2s,waking tech,I&O,fdg.freq/murtaza,nipp care/lanolin)   Breastfeeding History   Breastfeeding History no  (first baby)   Feeding Infant   Satiety Cues sleeping after feeding   Audible Swallow yes  (per pt.)   Suck/Swallow Coordination present  (per pt.)   Lactation Referrals   Lactation Consult Follow up;Knowledge deficit   Lactation Interventions   Attachment Promotion skin-to-skin contact encouraged;rooming-in promoted;role responsibility promoted;privacy provided;infant-mother separation minimized;family involvement promoted;face-to-face positioning promoted;environment adjusted   Breastfeeding Assistance support offered;feeding on demand promoted;feeding cue recognition promoted   Maternal Breastfeeding Support diary/feeding log utilized;encouragement offered;infant-mother separation minimized;lactation counseling provided

## 2017-08-08 NOTE — ANESTHESIA POSTPROCEDURE EVALUATION
"Anesthesia Post Evaluation    Patient: Romana Hilario    Procedure(s) Performed: * No procedures listed *    Final Anesthesia Type: epidural  Patient location during evaluation: labor & delivery  Patient participation: Yes- Able to Participate  Level of consciousness: awake and alert and oriented  Post-procedure vital signs: reviewed and stable  Pain management: adequate  Airway patency: patent  PONV status at discharge: No PONV  Anesthetic complications: no      Cardiovascular status: blood pressure returned to baseline  Respiratory status: unassisted and room air  Hydration status: euvolemic  Follow-up not needed.  Comments: No catheter in back  No headache/neckache/backache  Full return of neurological function  Able to urinate  Advised patient to report any new problems of back pain, especially with fever or decreasing bladder function occurring during coming days to weeks          Visit Vitals  /82 (BP Location: Right arm, Patient Position: Lying, BP Method: Automatic)   Pulse 78   Temp 36.4 °C (97.5 °F) (Oral)   Resp 18   Ht 5' 6" (1.676 m)   Wt 71.2 kg (157 lb)   LMP 10/31/2016 (Exact Date)   SpO2 100%   Breastfeeding? Yes   BMI 25.34 kg/m²       Pain/Kacy Score: Pain Rating Prior to Med Admin: 5 (8/8/2017  8:21 AM)  Pain Rating Post Med Admin: 0 (8/8/2017  9:20 AM)    No catheter in back  No headache/neckache/backache  Full return of neurological function  Able to urinate  Advised patient to report any new problems of back pain, especially with fever or decreasing bladder function occurring during coming days to weeks  "

## 2017-08-09 VITALS
OXYGEN SATURATION: 100 % | HEART RATE: 78 BPM | HEIGHT: 66 IN | RESPIRATION RATE: 18 BRPM | TEMPERATURE: 98 F | DIASTOLIC BLOOD PRESSURE: 71 MMHG | BODY MASS INDEX: 25.23 KG/M2 | WEIGHT: 157 LBS | SYSTOLIC BLOOD PRESSURE: 118 MMHG

## 2017-08-09 PROCEDURE — 25000003 PHARM REV CODE 250: Performed by: OBSTETRICS & GYNECOLOGY

## 2017-08-09 PROCEDURE — 63600175 PHARM REV CODE 636 W HCPCS: Performed by: OBSTETRICS & GYNECOLOGY

## 2017-08-09 PROCEDURE — 90715 TDAP VACCINE 7 YRS/> IM: CPT | Performed by: OBSTETRICS & GYNECOLOGY

## 2017-08-09 PROCEDURE — 3E0234Z INTRODUCTION OF SERUM, TOXOID AND VACCINE INTO MUSCLE, PERCUTANEOUS APPROACH: ICD-10-PCS | Performed by: OBSTETRICS & GYNECOLOGY

## 2017-08-09 PROCEDURE — 90471 IMMUNIZATION ADMIN: CPT | Performed by: OBSTETRICS & GYNECOLOGY

## 2017-08-09 RX ADMIN — CLOSTRIDIUM TETANI TOXOID ANTIGEN (FORMALDEHYDE INACTIVATED), CORYNEBACTERIUM DIPHTHERIAE TOXOID ANTIGEN (FORMALDEHYDE INACTIVATED), BORDETELLA PERTUSSIS TOXOID ANTIGEN (GLUTARALDEHYDE INACTIVATED), BORDETELLA PERTUSSIS FILAMENTOUS HEMAGGLUTININ ANTIGEN (FORMALDEHYDE INACTIVATED), BORDETELLA PERTUSSIS PERTACTIN ANTIGEN, AND BORDETELLA PERTUSSIS FIMBRIAE 2/3 ANTIGEN 0.5 ML: 5; 2; 2.5; 5; 3; 5 INJECTION, SUSPENSION INTRAMUSCULAR at 10:08

## 2017-08-09 RX ADMIN — NAPROXEN 500 MG: 500 TABLET ORAL at 10:08

## 2017-08-09 NOTE — PLAN OF CARE
Problem: Breastfeeding (Adult,Obstetrics,Pediatric)  Goal: Signs and Symptoms of Listed Potential Problems Will be Absent, Minimized or Managed (Breastfeeding)  Signs and symptoms of listed potential problems will be absent, minimized or managed by discharge/transition of care (reference Breastfeeding (Adult,Obstetrics,Pediatric) CPG).   Outcome: Outcome(s) achieved Date Met: 08/09/17  Mother will breastfeed on cue at least eight or more times in 24 hours. Will keep track of feedings and wet and dirty diapers. Will call with any breastfeeding needs.

## 2017-08-09 NOTE — LACTATION NOTE
08/09/17 0920   Maternal Infant Assessment   Breast Size Issue none   Breast Shape round   Breast Density soft   Areola elastic   Nipple(s) everted   Infant Assessment   Weight Loss (%) 3.6   Mouth Size average   Sucking Reflex present   Rooting Reflex present   Swallow Reflex present   LATCH Score   Latch 2-->grasps breast, tongue down, lips flanged, rhythmic sucking   Audible Swallowing 2-->spontaneous and intermittent (24 hrs old)   Type Of Nipple 2-->everted (after stimulation)   Comfort (Breast/Nipple) 2-->soft/nontender   Hold (Positioning) 2-->no assist from staff, mother able to position/hold infant   Score (less than 7 for 2/more consecutive times, consult Lactation Consultant) 10   Maternal Infant Feeding   Maternal Preparation breast care;hand hygiene   Maternal Emotional State relaxed;independent   Infant Positioning cradle   Signs of Milk Transfer audible swallow;infant jaw motion present   Presence of Pain no   Time Spent (min) 15-30 min   Latch Assistance no   Engorgement Measures supportive bra encouraged   Breastfeeding Education medication effects;increasing milk supply;importance of skin-to-skin contact;diet;adequate milk volume;adequate infant intake;prenatal vitamins continued   Feeding Infant   Satiety Cues infant releases breast   Feeding Tolerance/Success alert for feeding;coordinated suck;coordinated swallow;strong suck   Effective Latch During Feeding yes   Audible Swallow yes   Suck/Swallow Coordination present   Skin-to-Skin Contact During Feeding yes   Lactation Referrals   Lactation Consult Breastfeeding assessment  (discharge teaching)   Lactation Interventions   Attachment Promotion breastfeeding assistance provided;skin-to-skin contact encouraged;privacy provided;infant-mother separation minimized;family involvement promoted;counseling provided;role responsibility promoted   Breastfeeding Assistance feeding cue recognition promoted;feeding on demand promoted;infant latch-on  verified;infant suck/swallow verified;support offered   Maternal Breastfeeding Support diary/feeding log utilized;encouragement offered;infant-mother separation minimized;lactation counseling provided;maternal hydration promoted;maternal nutrition promoted;maternal rest encouraged

## 2017-08-09 NOTE — DISCHARGE INSTRUCTIONS
"Patient Discharge Instructions for Postpartum Women    Resume Regular Diet  Increase activity gradually, no heavy lifting  Shower  No tampons, douching or sexual intercourse.  Discuss birth control options with your physician.  Wear a support bra  Return to work/school when you've been cleared by a physician    Call your physician if     *Fever of 100.4 or higher  *Persistent nausea/ vomiting  *Incisional drainage  *Heavy vaginal bleeding or large clots (Heavy bleeding is soaking 1 pad in an hour)  *Swelling and pain in arms or legs  *Severe headaches, blurred vision or fainting  *Shortness of breath  *Frequency and burning with urination  *Signs of postpartum depression, discuss these signs with your physician    Call lactation services for questions regarding feeding, nipple and breast care, and general questions about lactation.  They can be reached at [phone]         Understanding Postpartum Depression    You've just had a baby.  You know you should be excited and happy.  But instead you find yourself crying for no reason.  You may have trouble coping with your daily tasks.  You feel sad, tired, and hopeless most of the time.  You may even feel ashamed or guilty.  But what you're going through is not your fault and you can feel better.  Talk to your doctor.  He or she can help.    Depression After Childbirth    You may be weepy and tired right after giving birth.  These feelings are normal.  They're sometimes called the "baby blues."  These blues go away 2-3 weeks.  However, postpartum (meaning "after birth") depression lasts much longer and is more sever than the "baby blues."  It can make you feel sad and hopeless.  You may also fear that your baby will be harmed and worry about being a bad mother.      What is Depression?    Depression is a mood disorder that affects the way you think and feel.  The most common symptom is a feeling of deep sadness.  You may also feel as if you just can't cope with life.  "   Other symptoms include:      * Gaining or loosing weight  * Sleeping too much or too little  * Feeling tired all the time  * Feeling restless  * Fears of harming your baby   * Lack of interest in your baby  * Feeling worthless or guilty  * No longer finding pleasure in things you used to  * Having trouble thinking clearly or making decisions  * Thoughts of hurting yourself or your baby    What Causes Postpartum Depression    The exact causes of postpartum depression isn't known.  It may be due to changes in your hormones during and after childbirth.  You may also be tired from caring for your baby and adjusting to being a mother.  All these factors may make you feel depressed.  In some cases, your genes may also play a role.    Depression Can Be Treated    The good news is that there are many ways to treat postpartum depression.  Talking to your doctor is the first step toward feeling better.    Resources:    * National [place] of Mental Health  -- 192.568.4331    www.nimh.nih.gov    * National [place] on Mental Illness --138.660.3865    Www.huyen.org    * Mental Health [facility] -- 733.740.9854     Www.[facility].org    * National Suicide Hotline --824.773.1057 (800-SUICIDE)    6668-5349 [employer]  All rights reserved.  This information is not intended as a substitute for professional medical care.  Always follow up with your healthcare professional's instructions.        Breastfeeding Discharge Instructions       Feed the baby at the earliest sign of hunger or comfort  o Hands to mouth, sucking motions  o Rooting or searching for something to suck on  o Dont wait for crying - it is a sign of distress     The feedings may be 8-12 times per 24hrs and will not follow a schedule   Avoid pacifiers and bottles for the first 4 weeks   Alternate the breast you start the feeding with, or start with the breast that feels the fullest   Switch breasts when the baby takes himself off the breast or falls  asleep   Keep offering breasts until the baby looks full, no longer gives hunger signs, and stays asleep when placed on his back in the crib   If the baby is sleepy and wont wake for a feeding, put the baby skin-to-skin dressed in a diaper against the mothers bare chest   Sleep near your baby   The baby should be positioned and latched on to the breast correctly  o Chest-to-chest, chin in the breast  o Babys lips are flipped outward  o Babys mouth is stretched open wide like a shout  o Babys sucking should feel like tugging to the mother  - The baby should be drinking at the breast:  o You should hear swallowing or gulping throughout the feeding  o You should see milk on the babys lips when he comes off the breast  o Your breasts should be softer when the baby is finished feeding  o The baby should look relaxed at the end of feedings  o After the 4th day and your milk is in:  o The babys poop should turn bright yellow and be loose, watery, and seedy  o The baby should have at least 3-4 poops the size of the palm of your hand per day  o The baby should have at least 5-6 wet diapers per day  o The urine should be light yellow in color  You should drink when you are thirsty and eat a healthy diet when you are    hungry.     Take naps to get the rest you need.   Take medications and/or drink alcohol only with permission of your obstetrician    or the babys pediatrician.  You can also call the Infant Risk Center,   ([phone]), Monday-Friday, 8am-5pm Central time, to get the most   up-to-date evidence-based information on the use of medications during   pregnancy and breastfeeding.      The baby should be examined by a pediatrician at 3-5 days of age.  Once your   milk comes in, the baby should be gaining at least ½ - 1oz each day and should be back to birthweight no later than 10-14 days of age.          Community Resources    [facility] Breastfeeding Warmline: [phone]  [facility]  clinics: provide incentives and breastpumps to eligible mothers  [employer] (LLLI):  mother-to-mother support group website        www.lll.org  [facility] [employer]eche Leedward mother-to-mother support groups:        www.llkendraElli Health.[employer]        [facility] [employer]eche [employer] [facility]   Dr. Joby Thorne website for latch videos and general information:        www.breastfeedinginc.ca  Infant Risk Center is a call center that provides information about the safety of taking medications while breastfeeding.  Call 1-432.616.6141, M-F, 8am-5pm, CT.  International Lactation Consultant Association provides resources for assistance:        www.ilca.org  [facility] Breastfeeding Coalition provides informationand resources for parents  and the community    http://[facility] [place]astfeeding.org     [place] is a mom-to-mom support group:                             www.[employer]IV[employer]//breastfeedng-support/  Partners for Healthy Babies:  7-606-299-BABY(4427)  [employer]: a breastfeeding support group for women of color, 382.994.9545

## 2017-08-09 NOTE — DISCHARGE SUMMARY
Delivery Discharge Summary  Obstetrics      Primary OB Clinician: Wiedemann    Admission date: 2017  Discharge date: 2017    Admit Dx:   Patient Active Problem List   Diagnosis    Family history of congenital heart defect -FOB sister  at 4 days, will need fetal echo    Left sided abdominal pain    Pyelonephritis    Abdominal pain complicating pregnancy    Abdominal pain    Delivery normal    Decreased fetal movement affecting management of pregnancy in third trimester     Discharge Dx:   Patient Active Problem List   Diagnosis    Family history of congenital heart defect -FOB sister  at 4 days, will need fetal echo    Left sided abdominal pain    Pyelonephritis    Abdominal pain complicating pregnancy    Abdominal pain    Delivery normal    Decreased fetal movement affecting management of pregnancy in third trimester       Disposition: To home, self care    Procedure:     Hospital Course:  Pt is a 21 y.o. now  by , PPD # 2 was admitted on 2017 for induction of labor. On initial assessment, vital signs were stable.  Patient was subsequently admitted to labor and delivery unit.  Patient subsequently delivered a viable infant, please see delivery information below or full delivery note for further details. Pt was in stable condition post delivery and was transferred to the Mother-Baby Unit in a stable condition. Her postpartum course was uncomplicated. On discharge day, patient's pain is well  controlled with oral pain medications. Pt is tolerating ambulation without SOB or CP, and PO diet without N/V. Reports lochia is minimal in degree. Denies any HA, vision changes, F/C, LE swelling. Post Partum H/H:10.3/30.0. Pt in stable condition and ready for discharge, instructed to continue PNV, pain medications, and to follow up in the OB clinic with Dr. WIedemann.    Delivery:    Episiotomy: None   Lacerations: 1st   Repair suture:     Repair # of packets:     Blood loss (ml):  200     Birth information:  YOB: 2019   Time of birth: 1:47 PM   Sex: female   Delivery type: Vaginal, Spontaneous Delivery   Gestational Age: 40w0d    Delivery Clinician:      Other providers:       Additional  information:  Forceps:    Vacuum:    Breech:    Observed anomalies      Living?:           APGARS  One minute Five minutes Ten minutes   Skin color:         Heart rate:         Grimace:         Muscle tone:         Breathing:         Totals: 9  9        Placenta: Delivered:       appearance      Disposition:Home or Self Care    Patient Instructions:   Current Discharge Medication List      CONTINUE these medications which have NOT CHANGED    Details   PNV#67-iron ps-FA cmb#1-dha (VITAFOL ULTRA) 29 mg iron- 1 mg-200 mg Cap Take 1 tablet by mouth once daily.  Qty: 30 capsule, Refills: 12               Discharge Procedure Orders  Diet general     Activity as tolerated     Call MD for:  temperature >100.4     Call MD for:  persistent nausea and vomiting or diarrhea     Call MD for:  severe uncontrolled pain     Call MD for:  difficulty breathing or increased cough     Call MD for:  severe persistent headache     Call MD for:  worsening rash     Call MD for:  persistent dizziness, light-headedness, or visual disturbances     Call MD for:  increased confusion or weakness     8/7/2017      Nilda Tiwari MD, FACOG  OB/GYN  Pager: [phone]

## 2017-08-09 NOTE — PLAN OF CARE
0700 - assumed care of pt    0800 - vss, nad, denies pain, tolerating regular diet, passing gas, appears to be bonding well w/infant.  POC: continue to monitor, d/c home today, TDAP VIS given to pt for review.  Reviewed POC w/pt.  Pt verbalized understanding.    1300 - reviewed d/c instructions w/pt.  D/c rx delivered to pt's room from outpatient pharmacy.  Pt verbalized understanding of d/c instructions and meds.  TDAP administered to L deltoid.  Pt demonstrates ability to care for mother and for infant.  Mother reports having help at home.  Vss, nad, pain well controlled w/po pain meds, tolerating regular diet, passing gas, breastfeeding well per pt, upon discharge.

## 2017-08-09 NOTE — PROGRESS NOTES
POSTPARTUM PROGRESS NOTE  Subjective:  Romana Hilario is a 21 y.o. female PPD #2 status post Spontaneous vaginal delivery. Patient is doing well this morning. She denies nausea, vomiting, fever or chills. Patient reports mild abdominal pain that is well relieved by PO pain medications. Lochia is mild and stable. Patient is voiding without difficulty and  ambulating with no difficulty. She reports feeling a littly jittery and requesting diet this morning to help.     Objective:     Temp:  [97.8 °F (36.6 °C)-98.5 °F (36.9 °C)] 97.8 °F (36.6 °C)  Pulse:  [69-92] 69  Resp:  [18] 18  SpO2:  [100 %] 100 %  BP: (101-120)/(57-82) 101/57    General:   alert, appears stated age and cooperative   Lungs:   clear to auscultation bilaterally   Heart:  regular rate and rhythm, S1, S2 normal   Abdomen:  soft, appropriately tender; bowel sounds normal   Uterus:  firm located at the umblicus.    Extremities: peripheral pulses normal, no pedal edema, no clubbing or cyanosis     Lab Review  No results found for this or any previous visit (from the past 12 hour(s)).    I/O  No intake or output data in the 24 hours ending 17 0831     Assessment:     Patient Active Problem List   Diagnosis    Family history of congenital heart defect -FOB sister  at 4 days, will need fetal echo    Left sided abdominal pain    Pyelonephritis    Abdominal pain complicating pregnancy    Abdominal pain    Delivery normal    Decreased fetal movement affecting management of pregnancy in third trimester        Plan:   1. Postpartum care:  - Patient doing well. Continue routine management and advances.  - Continue PO pain meds. Pain well controlled.  - Heme: H/h 10.3/30.0.  - Encourage ambulation  - Lactation - consult as needed  -f/u on jitters after breakfast. If feeling better can discharge home      Dispo: As patient meets milestones, will plan to discharge PPD#2 today. RTC for ppv. Given  precautions.    Nilda Tiwari MD, FACOG  OB/GYN  Pager: [phone]

## 2017-08-28 RX ORDER — CLOTRIMAZOLE AND BETAMETHASONE DIPROPIONATE 10; .64 MG/G; MG/G
CREAM TOPICAL
Qty: 15 G | Refills: 1 | Status: SHIPPED | OUTPATIENT
Start: 2017-08-28 | End: 2017-09-12

## 2017-08-28 RX ORDER — FLUCONAZOLE 100 MG/1
100 TABLET ORAL DAILY
Qty: 3 TABLET | Refills: 1 | Status: SHIPPED | OUTPATIENT
Start: 2017-08-28 | End: 2017-08-31

## 2017-08-28 NOTE — TELEPHONE ENCOUNTER
Returned patients call.   Patient is calling reporting a red rash and yeast infection that is affecting her vaginal area and perineal area. Patient requesting medication. Verbalized understanding. Please advise.

## 2017-08-28 NOTE — TELEPHONE ENCOUNTER
----- Message from Chuyita Mercedes sent at 8/28/2017 12:43 PM CDT -----  Contact: Self [phone]  Calling to talk to nurse has personal questions. Please advice

## 2017-09-12 ENCOUNTER — POSTPARTUM VISIT (OUTPATIENT)
Dept: OBSTETRICS AND GYNECOLOGY | Facility: CLINIC | Age: 22
End: 2017-09-12
Payer: COMMERCIAL

## 2017-09-12 VITALS
HEIGHT: 66 IN | SYSTOLIC BLOOD PRESSURE: 112 MMHG | BODY MASS INDEX: 21.83 KG/M2 | DIASTOLIC BLOOD PRESSURE: 64 MMHG | WEIGHT: 135.81 LBS

## 2017-09-12 PROCEDURE — 99024 POSTOP FOLLOW-UP VISIT: CPT | Mod: S$GLB,,, | Performed by: OBSTETRICS & GYNECOLOGY

## 2017-09-12 PROCEDURE — 99999 PR PBB SHADOW E&M-EST. PATIENT-LVL II: CPT | Mod: PBBFAC,,, | Performed by: OBSTETRICS & GYNECOLOGY

## 2017-09-12 RX ORDER — ACETAMINOPHEN AND CODEINE PHOSPHATE 120; 12 MG/5ML; MG/5ML
1 SOLUTION ORAL DAILY
Qty: 30 TABLET | Refills: 3 | Status: SHIPPED | OUTPATIENT
Start: 2017-09-12 | End: 2019-07-09

## 2017-09-12 NOTE — PROGRESS NOTES
"21 y.o.   OB History      Para Term  AB Living    1 1 1 0 0 1    SAB TAB Ectopic Multiple Live Births    0 0 0 0 1        Comlaining of:  p wants to discss brith contro;  Currently bf  Gong to cont for few months    ROS:  GENERAL: No fever, chills, fatigability or weight loss.  SKIN: No rashes, itching or changes in color or texture of skin.  HEAD: No headaches or recent head trauma.  EYES: Visual acuity fine. No photophobia, ocular pain or diplopia.  EARS: Denies ear pain, discharge or vertigo.  NOSE: No loss of smell, no epistaxis or postnasal drip.  MOUTH & THROAT: No hoarseness or change in voice. No excessive gum bleeding.  NODES: Denies swollen glands.  CHEST: Denies SNOW, cyanosis, wheezing, cough and sputum production.  CARDIOVASCULAR: Denies chest pain, PND, orthopnea or reduced exercise tolerance.  ABDOMEN: Appetite fine. No weight loss. Denies diarrhea, abdominal pain, hematemesis or blood in stool.  URINARY: No flank pain, dysuria or hematuria.  PERIPHERAL VASCULAR: No claudication or cyanosis.  MUSCULOSKELETAL: No joint stiffness or swelling. Denies back pain.  NEUROLOGIC: No history of seizures, paralysis, alteration of gait or coordination      PE: /64   Ht 5' 6" (1.676 m)   Wt 61.6 kg (135 lb 12.9 oz)   LMP 10/31/2016 (Exact Date)   BMI 21.92 kg/m²    abd sof  Vag healing    Plan, discussed ocp, prog only,   Call if stops bf, will change to reg pills  Pap between chr and new years        "

## 2017-09-13 ENCOUNTER — TELEPHONE (OUTPATIENT)
Dept: OBSTETRICS AND GYNECOLOGY | Facility: CLINIC | Age: 22
End: 2017-09-13

## 2017-09-13 NOTE — LETTER
September 13, 2017    Romana Hilario  [address] [place] [id number]              [facility]  [address] [facility], [address]  5th Floor [employer] [facility] [employer] [facility] [address]  Phone: [phone]    To Whom It May Concern:      Mrs. Hilario Date of Delivery: 08-  Patients deliver was a normal vaginal delivery with no complications.   If you have any further questions, feel free to contact the office.         Sincerely,          Michael A. Wiedemann, M.D.

## 2017-09-13 NOTE — TELEPHONE ENCOUNTER
----- Message from Kiesha Verdin sent at 9/13/2017 11:15 AM CDT -----  Contact: [phone]/self  Pt requesting to speak with the nurse concerning paperwork she need filled out for work.  Please call and advise

## 2017-09-14 ENCOUNTER — TELEPHONE (OUTPATIENT)
Dept: OBSTETRICS AND GYNECOLOGY | Facility: CLINIC | Age: 22
End: 2017-09-14

## 2017-11-07 ENCOUNTER — TELEPHONE (OUTPATIENT)
Dept: OBSTETRICS AND GYNECOLOGY | Facility: CLINIC | Age: 22
End: 2017-11-07

## 2017-11-07 NOTE — TELEPHONE ENCOUNTER
----- Message from Reginald Li sent at 11/7/2017 12:38 PM CST -----  Contact: [phone] self  Patient would like to speak with the doctor about birth control now that she has stop breast feeding. Please call and advise.

## 2017-11-14 ENCOUNTER — TELEPHONE (OUTPATIENT)
Dept: OBSTETRICS AND GYNECOLOGY | Facility: CLINIC | Age: 22
End: 2017-11-14

## 2017-11-14 NOTE — TELEPHONE ENCOUNTER
----- Message from Chuyita Mercedes sent at 11/14/2017 10:43 AM CST -----  Contact: Self [phone]  Patient is calling to talk to nurse concerning her birth control. Please advice

## 2017-11-14 NOTE — TELEPHONE ENCOUNTER
Pt is no longer breastfeeding and she would like to switch to a regular birth control. Please send something in for her   Scheduled procedure with Patient at      Telephone Information:   Mobile [phone]      Scheduled Via: Case Creation for [facility], case # [id number]  Patient prefers n/a facility rather than the doctor's preferred facility  Procedure date: 08/21/17  Procedure time: 56  Rep Contacted?: n/a  [facility] Program code: [phone]   phone?:No   email? :Yes  Entered into MD's [place]? n/a  Insurance confirmed as [facility] Complete, will be the same at time of procedure?: Yes  Insurance Accepted at Facility? Yes    The following have been confirmed:  Latex Allergy No  Diabetic No  Sleep Apnea No  Diuretic/Water pill No  Defibrillator/Pacemaker No  MRSA hx No  Blood thinners: Coumadin (Wafarin) or Plavix No      Aspirin No      Phentermine (diet pill) No  Pre-Op testing required No, Patient informed NA  Prep required? [place] to [address], [address] [facility],[address] AT [facility]  Briefly reviewed? Yes; Prep cost range discussed?  Yes  If procedure is scheduled 7 days or less, patient was told to  prep letter?: n/a

## 2018-01-03 ENCOUNTER — TELEPHONE (OUTPATIENT)
Dept: OBSTETRICS AND GYNECOLOGY | Facility: CLINIC | Age: 23
End: 2018-01-03

## 2018-01-03 RX ORDER — MISOPROSTOL 100 UG/1
100 TABLET ORAL 2 TIMES DAILY
Qty: 6 TABLET | Refills: 0 | Status: SHIPPED | OUTPATIENT
Start: 2018-01-03 | End: 2019-07-09

## 2018-01-03 NOTE — TELEPHONE ENCOUNTER
Returned patients call.   Patient is calling reporting heaving menstrual bleeding. Patient is on her first month of lo loestrin and states that she began her cycle last week and she is soaking through one maxi pad per hour and is feeling week. Patient states that she has not yet began her inactive pills but will in two days. Verbalized understanding. Please advise.

## 2018-01-03 NOTE — TELEPHONE ENCOUNTER
----- Message from Sherrie Brown sent at 1/3/2018  8:19 AM CST -----  Contact: [phone]/ self   Pt its requesting to speak with the nurse , states she is having bleeding and cramping . Please advise

## 2018-01-03 NOTE — TELEPHONE ENCOUNTER
----- Message from Tiffany Ho sent at 1/3/2018  8:44 AM CST -----  Contact: Self. [phone]  Patient is returning your call.  Please advise.

## 2018-01-04 ENCOUNTER — TELEPHONE (OUTPATIENT)
Dept: OBSTETRICS AND GYNECOLOGY | Facility: CLINIC | Age: 23
End: 2018-01-04

## 2018-01-04 NOTE — TELEPHONE ENCOUNTER
----- Message from Chantal Jc sent at 1/4/2018  3:40 PM CST -----  Contact: [phone]/self   Patient requesting to speak with you regarding birth control medication . Please advise.

## 2019-12-02 NOTE — PLAN OF CARE
Problem: Patient Care Overview  Goal: Individualization & Mutuality  Outcome: Ongoing (interventions implemented as appropriate)  vss pt tolerating reg diet, ambulating and voiding without difficulty. Mother  and infant bonding noted. Family support at bedside. Pt desires to shower. Pt tolerated well.  Pain well controlled with current po pain meds per md order. Pt will notify rn if pain med needed.  Discussed 24 hour routine tests on infnat with parents.. Encouraged feeding infant on cue atleast 8 or more times in 24 hours.        STAFF NOTE:  I have seen the pt and fully reviewed patient's medical record. Case formally discussed with resident and I concur with the resident's history, assessment, and recommendations.

## 2021-03-04 LAB
HPV APTIMA: NEGATIVE
PAP RECOMMENDATION EXT: NORMAL

## 2021-12-28 ENCOUNTER — LAB VISIT (OUTPATIENT)
Dept: PRIMARY CARE CLINIC | Facility: OTHER | Age: 26
End: 2021-12-28
Payer: COMMERCIAL

## 2021-12-28 DIAGNOSIS — Z11.52 ENCOUNTER FOR SCREENING FOR COVID-19: Primary | ICD-10-CM

## 2021-12-28 DIAGNOSIS — R05.9 COUGH: ICD-10-CM

## 2021-12-28 PROCEDURE — U0003 INFECTIOUS AGENT DETECTION BY NUCLEIC ACID (DNA OR RNA); SEVERE ACUTE RESPIRATORY SYNDROME CORONAVIRUS 2 (SARS-COV-2) (CORONAVIRUS DISEASE [COVID-19]), AMPLIFIED PROBE TECHNIQUE, MAKING USE OF HIGH THROUGHPUT TECHNOLOGIES AS DESCRIBED BY CMS-2020-01-R: HCPCS

## 2021-12-29 LAB
SARS-COV-2 RNA RESP QL NAA+PROBE: NOT DETECTED
SARS-COV-2- CYCLE NUMBER: NORMAL

## 2022-08-04 ENCOUNTER — TELEPHONE (OUTPATIENT)
Dept: INTERNAL MEDICINE | Facility: CLINIC | Age: 27
End: 2022-08-04
Payer: COMMERCIAL

## 2022-08-04 NOTE — TELEPHONE ENCOUNTER
Left a VM letting the patient know her appointment was reschedule due to Dr Parnell being out sick on 08/05.

## 2022-09-09 ENCOUNTER — OFFICE VISIT (OUTPATIENT)
Dept: FAMILY MEDICINE | Facility: CLINIC | Age: 27
End: 2022-09-09
Payer: COMMERCIAL

## 2022-09-09 VITALS
OXYGEN SATURATION: 99 % | HEART RATE: 85 BPM | WEIGHT: 119.06 LBS | DIASTOLIC BLOOD PRESSURE: 78 MMHG | BODY MASS INDEX: 19.13 KG/M2 | HEIGHT: 66 IN | SYSTOLIC BLOOD PRESSURE: 110 MMHG

## 2022-09-09 DIAGNOSIS — Z11.4 ENCOUNTER FOR SCREENING FOR HIV: ICD-10-CM

## 2022-09-09 DIAGNOSIS — H61.23 BILATERAL IMPACTED CERUMEN: ICD-10-CM

## 2022-09-09 DIAGNOSIS — Z11.59 NEED FOR HEPATITIS C SCREENING TEST: Primary | ICD-10-CM

## 2022-09-09 DIAGNOSIS — R79.89 LOW VITAMIN D LEVEL: ICD-10-CM

## 2022-09-09 DIAGNOSIS — Z13.1 SCREENING FOR DIABETES MELLITUS: ICD-10-CM

## 2022-09-09 DIAGNOSIS — H61.20 IMPACTED CERUMEN, UNSPECIFIED LATERALITY: ICD-10-CM

## 2022-09-09 DIAGNOSIS — M25.541 ARTHRALGIA OF BOTH HANDS: ICD-10-CM

## 2022-09-09 DIAGNOSIS — Z87.440 HISTORY OF RECURRENT UTI (URINARY TRACT INFECTION): ICD-10-CM

## 2022-09-09 DIAGNOSIS — M25.542 ARTHRALGIA OF BOTH HANDS: ICD-10-CM

## 2022-09-09 DIAGNOSIS — F43.10 POST-TRAUMATIC STRESS DISORDER: ICD-10-CM

## 2022-09-09 DIAGNOSIS — Z11.3 ROUTINE SCREENING FOR STI (SEXUALLY TRANSMITTED INFECTION): ICD-10-CM

## 2022-09-09 DIAGNOSIS — Z00.00 ENCOUNTER FOR ANNUAL PHYSICAL EXAM: ICD-10-CM

## 2022-09-09 DIAGNOSIS — R25.1 TREMOR OF BOTH HANDS: ICD-10-CM

## 2022-09-09 PROCEDURE — 3008F PR BODY MASS INDEX (BMI) DOCUMENTED: ICD-10-PCS | Mod: CPTII,S$GLB,, | Performed by: STUDENT IN AN ORGANIZED HEALTH CARE EDUCATION/TRAINING PROGRAM

## 2022-09-09 PROCEDURE — 99204 OFFICE O/P NEW MOD 45 MIN: CPT | Mod: S$GLB,,, | Performed by: STUDENT IN AN ORGANIZED HEALTH CARE EDUCATION/TRAINING PROGRAM

## 2022-09-09 PROCEDURE — 1160F PR REVIEW ALL MEDS BY PRESCRIBER/CLIN PHARMACIST DOCUMENTED: ICD-10-PCS | Mod: CPTII,S$GLB,, | Performed by: STUDENT IN AN ORGANIZED HEALTH CARE EDUCATION/TRAINING PROGRAM

## 2022-09-09 PROCEDURE — 3044F HG A1C LEVEL LT 7.0%: CPT | Mod: CPTII,S$GLB,, | Performed by: STUDENT IN AN ORGANIZED HEALTH CARE EDUCATION/TRAINING PROGRAM

## 2022-09-09 PROCEDURE — 1159F PR MEDICATION LIST DOCUMENTED IN MEDICAL RECORD: ICD-10-PCS | Mod: CPTII,S$GLB,, | Performed by: STUDENT IN AN ORGANIZED HEALTH CARE EDUCATION/TRAINING PROGRAM

## 2022-09-09 PROCEDURE — 99999 PR PBB SHADOW E&M-EST. PATIENT-LVL IV: ICD-10-PCS | Mod: PBBFAC,,, | Performed by: STUDENT IN AN ORGANIZED HEALTH CARE EDUCATION/TRAINING PROGRAM

## 2022-09-09 PROCEDURE — 3044F PR MOST RECENT HEMOGLOBIN A1C LEVEL <7.0%: ICD-10-PCS | Mod: CPTII,S$GLB,, | Performed by: STUDENT IN AN ORGANIZED HEALTH CARE EDUCATION/TRAINING PROGRAM

## 2022-09-09 PROCEDURE — 99204 PR OFFICE/OUTPT VISIT, NEW, LEVL IV, 45-59 MIN: ICD-10-PCS | Mod: S$GLB,,, | Performed by: STUDENT IN AN ORGANIZED HEALTH CARE EDUCATION/TRAINING PROGRAM

## 2022-09-09 PROCEDURE — 3008F BODY MASS INDEX DOCD: CPT | Mod: CPTII,S$GLB,, | Performed by: STUDENT IN AN ORGANIZED HEALTH CARE EDUCATION/TRAINING PROGRAM

## 2022-09-09 PROCEDURE — 1159F MED LIST DOCD IN RCRD: CPT | Mod: CPTII,S$GLB,, | Performed by: STUDENT IN AN ORGANIZED HEALTH CARE EDUCATION/TRAINING PROGRAM

## 2022-09-09 PROCEDURE — 99999 PR PBB SHADOW E&M-EST. PATIENT-LVL IV: CPT | Mod: PBBFAC,,, | Performed by: STUDENT IN AN ORGANIZED HEALTH CARE EDUCATION/TRAINING PROGRAM

## 2022-09-09 PROCEDURE — 1160F RVW MEDS BY RX/DR IN RCRD: CPT | Mod: CPTII,S$GLB,, | Performed by: STUDENT IN AN ORGANIZED HEALTH CARE EDUCATION/TRAINING PROGRAM
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
## 2022-10-07 ENCOUNTER — PATIENT OUTREACH (OUTPATIENT)
Dept: ADMINISTRATIVE | Facility: HOSPITAL | Age: 27
End: 2022-10-07

## 2022-11-08 ENCOUNTER — PATIENT MESSAGE (OUTPATIENT)
Dept: ADMINISTRATIVE | Facility: HOSPITAL | Age: 27
End: 2022-11-08
Payer: COMMERCIAL

## 2022-11-08 NOTE — PROGRESS NOTES
Care Everywhere updates requested and reviewed.  Immunizations reconciled. Media reports reviewed.  Duplicate HM overrides and  orders removed.  Overdue HM topic chart audit and/or requested.  Overdue lab testing linked to upcoming lab appointments if applies.        Health Maintenance Due   Topic Date Due    COVID-19 Vaccine (1) Never done    Pap Smear  2017    Influenza Vaccine (1) 2022

## 2022-12-12 PROBLEM — Z00.00 ENCOUNTER FOR ANNUAL PHYSICAL EXAM: Status: RESOLVED | Noted: 2022-09-09 | Resolved: 2022-12-12

## 2023-01-10 ENCOUNTER — PATIENT MESSAGE (OUTPATIENT)
Dept: ADMINISTRATIVE | Facility: HOSPITAL | Age: 28
End: 2023-01-10
Payer: COMMERCIAL

## 2023-01-10 ENCOUNTER — PATIENT OUTREACH (OUTPATIENT)
Dept: ADMINISTRATIVE | Facility: HOSPITAL | Age: 28
End: 2023-01-10
Payer: COMMERCIAL

## 2023-01-24 ENCOUNTER — OFFICE VISIT (OUTPATIENT)
Dept: FAMILY MEDICINE | Facility: CLINIC | Age: 28
End: 2023-01-24
Payer: COMMERCIAL

## 2023-01-24 VITALS
OXYGEN SATURATION: 97 % | SYSTOLIC BLOOD PRESSURE: 108 MMHG | DIASTOLIC BLOOD PRESSURE: 64 MMHG | HEIGHT: 66 IN | BODY MASS INDEX: 19.13 KG/M2 | HEART RATE: 87 BPM | WEIGHT: 119 LBS

## 2023-01-24 DIAGNOSIS — E78.00 PURE HYPERCHOLESTEROLEMIA: ICD-10-CM

## 2023-01-24 DIAGNOSIS — F41.9 ANXIETY DISORDER, UNSPECIFIED TYPE: ICD-10-CM

## 2023-01-24 DIAGNOSIS — N92.6 MISSED MENSES: Primary | ICD-10-CM

## 2023-01-24 LAB
B-HCG UR QL: NEGATIVE
CTP QC/QA: YES

## 2023-01-24 PROCEDURE — 81025 POCT URINE PREGNANCY: ICD-10-PCS | Mod: S$GLB,,, | Performed by: STUDENT IN AN ORGANIZED HEALTH CARE EDUCATION/TRAINING PROGRAM

## 2023-01-24 PROCEDURE — 3078F PR MOST RECENT DIASTOLIC BLOOD PRESSURE < 80 MM HG: ICD-10-PCS | Mod: CPTII,S$GLB,, | Performed by: STUDENT IN AN ORGANIZED HEALTH CARE EDUCATION/TRAINING PROGRAM

## 2023-01-24 PROCEDURE — 3074F PR MOST RECENT SYSTOLIC BLOOD PRESSURE < 130 MM HG: ICD-10-PCS | Mod: CPTII,S$GLB,, | Performed by: STUDENT IN AN ORGANIZED HEALTH CARE EDUCATION/TRAINING PROGRAM

## 2023-01-24 PROCEDURE — 1160F PR REVIEW ALL MEDS BY PRESCRIBER/CLIN PHARMACIST DOCUMENTED: ICD-10-PCS | Mod: CPTII,S$GLB,, | Performed by: STUDENT IN AN ORGANIZED HEALTH CARE EDUCATION/TRAINING PROGRAM

## 2023-01-24 PROCEDURE — 99999 PR PBB SHADOW E&M-EST. PATIENT-LVL III: CPT | Mod: PBBFAC,,, | Performed by: STUDENT IN AN ORGANIZED HEALTH CARE EDUCATION/TRAINING PROGRAM

## 2023-01-24 PROCEDURE — 99212 OFFICE O/P EST SF 10 MIN: CPT | Mod: S$GLB,,, | Performed by: STUDENT IN AN ORGANIZED HEALTH CARE EDUCATION/TRAINING PROGRAM

## 2023-01-24 PROCEDURE — 3008F BODY MASS INDEX DOCD: CPT | Mod: CPTII,S$GLB,, | Performed by: STUDENT IN AN ORGANIZED HEALTH CARE EDUCATION/TRAINING PROGRAM

## 2023-01-24 PROCEDURE — 3074F SYST BP LT 130 MM HG: CPT | Mod: CPTII,S$GLB,, | Performed by: STUDENT IN AN ORGANIZED HEALTH CARE EDUCATION/TRAINING PROGRAM

## 2023-01-24 PROCEDURE — 99999 PR PBB SHADOW E&M-EST. PATIENT-LVL III: ICD-10-PCS | Mod: PBBFAC,,, | Performed by: STUDENT IN AN ORGANIZED HEALTH CARE EDUCATION/TRAINING PROGRAM

## 2023-01-24 PROCEDURE — 99212 PR OFFICE/OUTPT VISIT, EST, LEVL II, 10-19 MIN: ICD-10-PCS | Mod: S$GLB,,, | Performed by: STUDENT IN AN ORGANIZED HEALTH CARE EDUCATION/TRAINING PROGRAM

## 2023-01-24 PROCEDURE — 1159F PR MEDICATION LIST DOCUMENTED IN MEDICAL RECORD: ICD-10-PCS | Mod: CPTII,S$GLB,, | Performed by: STUDENT IN AN ORGANIZED HEALTH CARE EDUCATION/TRAINING PROGRAM

## 2023-01-24 PROCEDURE — 1160F RVW MEDS BY RX/DR IN RCRD: CPT | Mod: CPTII,S$GLB,, | Performed by: STUDENT IN AN ORGANIZED HEALTH CARE EDUCATION/TRAINING PROGRAM

## 2023-01-24 PROCEDURE — 3008F PR BODY MASS INDEX (BMI) DOCUMENTED: ICD-10-PCS | Mod: CPTII,S$GLB,, | Performed by: STUDENT IN AN ORGANIZED HEALTH CARE EDUCATION/TRAINING PROGRAM

## 2023-01-24 PROCEDURE — 81025 URINE PREGNANCY TEST: CPT | Mod: S$GLB,,, | Performed by: STUDENT IN AN ORGANIZED HEALTH CARE EDUCATION/TRAINING PROGRAM

## 2023-01-24 PROCEDURE — 3078F DIAST BP <80 MM HG: CPT | Mod: CPTII,S$GLB,, | Performed by: STUDENT IN AN ORGANIZED HEALTH CARE EDUCATION/TRAINING PROGRAM

## 2023-01-24 PROCEDURE — 1159F MED LIST DOCD IN RCRD: CPT | Mod: CPTII,S$GLB,, | Performed by: STUDENT IN AN ORGANIZED HEALTH CARE EDUCATION/TRAINING PROGRAM

## 2023-01-24 NOTE — PROGRESS NOTES
Subjective:       Patient ID: Romana Hilario is a 27 y.o. female.    Chief Complaint: Follow-up (6 WEEK/Discuss having a pregnancy test.)      Headache   Pertinent negatives include no back pain, coughing, ear pain, fever, seizures or weakness. Abdominal pain: left loin pain.  Follow-up  Pertinent negatives include no coughing, fatigue, fever, headaches or weakness. Abdominal pain: left loin pain.    26F with h/o recurrent UTI (ESBL), recently seen for anxiety mgt for f/u today. Since last visit she has been able to identify the triggers of her anxiety and has worked on it, She endorses improvement in symptoms. She is only concerned she may be pregnant , LMP was beginning of January she did home pregnancy test and both were negative and she is currently on weekly birth-control patch but she has noticed tender breast, and eating around the clock. She endorses no nausera, vomiting    Labs reviewed and findings discussed with patient    Past Medical History:   Diagnosis Date    Acute pyelonephritis in second trimester, antepartum     Depression     no meds    Recurrent UTI (urinary tract infection)        History reviewed. No pertinent surgical history.    Family History   Problem Relation Age of Onset    Breast cancer Neg Hx     Colon cancer Neg Hx     Ovarian cancer Neg Hx        Social History     Socioeconomic History    Marital status: Single   Tobacco Use    Smoking status: Passive Smoke Exposure - Never Smoker    Smokeless tobacco: Never   Substance and Sexual Activity    Alcohol use: Yes     Comment: social    Sexual activity: Yes     Partners: Male       Review of Systems   Constitutional:  Negative for fatigue and fever.   HENT:  Negative for ear discharge and ear pain.    Respiratory:  Negative for cough and shortness of breath.    Gastrointestinal:  Abdominal pain: left loin pain.   Endocrine: Negative for polydipsia and polyphagia.   Genitourinary:  Negative for dysuria.   Musculoskeletal:  Negative for  "back pain and gait problem.   Neurological:  Negative for seizures, weakness, light-headedness and headaches.   Psychiatric/Behavioral:  Negative for dysphoric mood. The patient is nervous/anxious.        Objective:     Vitals:    01/24/23 1359   BP: 108/64   BP Location: Left arm   Patient Position: Sitting   BP Method: Small (Manual)   Pulse: 87   SpO2: 97%   Weight: 54 kg (119 lb)   Height: 5' 6" (1.676 m)          Physical Exam  Constitutional:       General: She is not in acute distress.     Appearance: Normal appearance.   HENT:      Head: Normocephalic and atraumatic.      Right Ear: Tympanic membrane normal.      Ears:      Comments: Bilateral slightly impacted ear wax  Eyes:      Extraocular Movements: Extraocular movements intact.      Conjunctiva/sclera: Conjunctivae normal.      Pupils: Pupils are equal, round, and reactive to light.   Cardiovascular:      Rate and Rhythm: Normal rate and regular rhythm.      Pulses: Normal pulses.      Heart sounds: Normal heart sounds.   Pulmonary:      Effort: Pulmonary effort is normal.      Breath sounds: Normal breath sounds.   Abdominal:      General: Abdomen is flat. Bowel sounds are normal.      Palpations: There is no mass.      Tenderness: There is no abdominal tenderness.   Musculoskeletal:      Cervical back: Normal range of motion.   Skin:     General: Skin is warm and dry.   Neurological:      General: No focal deficit present.      Mental Status: She is alert and oriented to person, place, and time.      Cranial Nerves: Cranial nerve deficit: Bilateral hand tremors.      Motor: No weakness.      Gait: Gait normal.   Psychiatric:         Mood and Affect: Mood normal.         Behavior: Behavior normal.         Laboratory:  CBC:  No results for input(s): WBC, RBC, HGB, HCT, PLT, MCV, MCH, MCHC in the last 2160 hours.    CMP:  No results for input(s): GLU, CALCIUM, ALBUMIN, PROT, NA, K, CO2, CL, BUN, ALKPHOS, ALT, AST, BILITOT in the last 2160 " hours.    Invalid input(s): CREATININ    URINALYSIS:  No results for input(s): COLORU, CLARITYU, SPECGRAV, PHUR, PROTEINUA, GLUCOSEU, BILIRUBINCON, BLOODU, WBCU, RBCU, BACTERIA, MUCUS, NITRITE, LEUKOCYTESUR, UROBILINOGEN, HYALINECASTS in the last 2160 hours.   LIPIDS:  No results for input(s): TSH, HDL, CHOL, TRIG, LDLCALC, CHOLHDL, NONHDLCHOL, TOTALCHOLEST in the last 2160 hours.    TSH:  No results for input(s): TSH in the last 2160 hours.    A1C:  No results for input(s): HGBA1C in the last 2160 hours.    Assessment:         ICD-10-CM ICD-9-CM   1. Missed menses  N92.6 626.4   2. Pure hypercholesterolemia  E78.00 272.0   3. Anxiety disorder, unspecified type  F41.9 300.00       Plan:       Suspected PTSD with SATISH  -recurrent worry, anxiety, irritability,restlessness and nightmares about past event  -now improved since identifying stressors  -c/w self mgt for now  -declined pharmacotherapy or referral to psych for now    Suspected pregnancy  -POCT preg test negative  -Home preg neg X2  -LMP was Jan 1-2nd, too early to detect if at all  -patient advised to test again in 2weeks if she does not see her menses    HLD  -c/w life style modifications      Vaccines recommended: COVID, flu, declined, understands benefits    Patient's Medications   New Prescriptions    No medications on file   Previous Medications    NORELGESTROMIN-ETHINYL ESTRADIOL ([facility]O [facility]RA) 150-35 MCG/24 HR    Place 1 patch onto the skin once a week.   Modified Medications    No medications on file   Discontinued Medications    No medications on file       Radha Bailon MD

## 2023-02-06 ENCOUNTER — PATIENT OUTREACH (OUTPATIENT)
Dept: ADMINISTRATIVE | Facility: HOSPITAL | Age: 28
End: 2023-02-06
Payer: COMMERCIAL

## 2023-02-06 NOTE — PROGRESS NOTES
Non-compliant GAP report chart review - Chart review completed for the following HM test if overdue  (Mammogram, Colonoscopy, Cervical Cancer Screening,  Diabetic lab testing, and/or Dilated EYE EXAM)  02/06/2023    Care Everywhere and Media reports - updates requested and reviewed.          Requested   Cervical  records         Health Maintenance Due   Topic Date Due    Pap Smear  12/23/2017

## 2023-02-09 ENCOUNTER — PATIENT OUTREACH (OUTPATIENT)
Dept: ADMINISTRATIVE | Facility: HOSPITAL | Age: 28
End: 2023-02-09
Payer: COMMERCIAL

## 2023-07-11 ENCOUNTER — OFFICE VISIT (OUTPATIENT)
Dept: OBSTETRICS AND GYNECOLOGY | Facility: CLINIC | Age: 28
End: 2023-07-11
Payer: COMMERCIAL

## 2023-07-11 VITALS
SYSTOLIC BLOOD PRESSURE: 114 MMHG | HEART RATE: 96 BPM | HEIGHT: 66 IN | DIASTOLIC BLOOD PRESSURE: 72 MMHG | BODY MASS INDEX: 18.38 KG/M2 | WEIGHT: 114.38 LBS

## 2023-07-11 DIAGNOSIS — Z01.419 ENCNTR FOR GYN EXAM (GENERAL) (ROUTINE) W/O ABN FINDINGS: Primary | ICD-10-CM

## 2023-07-11 DIAGNOSIS — R31.9 HEMATURIA, UNSPECIFIED TYPE: ICD-10-CM

## 2023-07-11 LAB
BILIRUB SERPL-MCNC: NEGATIVE MG/DL
BLOOD URINE, POC: NEGATIVE
CLARITY, POC UA: CLEAR
COLOR, POC UA: NORMAL
GLUCOSE UR QL STRIP: NEGATIVE
KETONES UR QL STRIP: NEGATIVE
LEUKOCYTE ESTERASE URINE, POC: NEGATIVE
NITRITE, POC UA: NEGATIVE
PH, POC UA: 7
PROTEIN, POC: NEGATIVE
SPECIFIC GRAVITY, POC UA: 1.01
UROBILINOGEN, POC UA: NEGATIVE

## 2023-07-11 PROCEDURE — 81002 POCT URINE DIPSTICK WITHOUT MICROSCOPE: ICD-10-PCS | Mod: S$GLB,,, | Performed by: STUDENT IN AN ORGANIZED HEALTH CARE EDUCATION/TRAINING PROGRAM

## 2023-07-11 PROCEDURE — 99999 PR PBB SHADOW E&M-EST. PATIENT-LVL III: CPT | Mod: PBBFAC,,, | Performed by: STUDENT IN AN ORGANIZED HEALTH CARE EDUCATION/TRAINING PROGRAM

## 2023-07-11 PROCEDURE — 99385 PREV VISIT NEW AGE 18-39: CPT | Mod: S$GLB,,, | Performed by: STUDENT IN AN ORGANIZED HEALTH CARE EDUCATION/TRAINING PROGRAM

## 2023-07-11 PROCEDURE — 99999 PR PBB SHADOW E&M-EST. PATIENT-LVL III: ICD-10-PCS | Mod: PBBFAC,,, | Performed by: STUDENT IN AN ORGANIZED HEALTH CARE EDUCATION/TRAINING PROGRAM

## 2023-07-11 PROCEDURE — 3074F SYST BP LT 130 MM HG: CPT | Mod: CPTII,S$GLB,, | Performed by: STUDENT IN AN ORGANIZED HEALTH CARE EDUCATION/TRAINING PROGRAM

## 2023-07-11 PROCEDURE — 87086 URINE CULTURE/COLONY COUNT: CPT | Performed by: STUDENT IN AN ORGANIZED HEALTH CARE EDUCATION/TRAINING PROGRAM

## 2023-07-11 PROCEDURE — 81002 URINALYSIS NONAUTO W/O SCOPE: CPT | Mod: S$GLB,,, | Performed by: STUDENT IN AN ORGANIZED HEALTH CARE EDUCATION/TRAINING PROGRAM

## 2023-07-11 PROCEDURE — 1160F RVW MEDS BY RX/DR IN RCRD: CPT | Mod: CPTII,S$GLB,, | Performed by: STUDENT IN AN ORGANIZED HEALTH CARE EDUCATION/TRAINING PROGRAM

## 2023-07-11 PROCEDURE — 3074F PR MOST RECENT SYSTOLIC BLOOD PRESSURE < 130 MM HG: ICD-10-PCS | Mod: CPTII,S$GLB,, | Performed by: STUDENT IN AN ORGANIZED HEALTH CARE EDUCATION/TRAINING PROGRAM

## 2023-07-11 PROCEDURE — 1159F MED LIST DOCD IN RCRD: CPT | Mod: CPTII,S$GLB,, | Performed by: STUDENT IN AN ORGANIZED HEALTH CARE EDUCATION/TRAINING PROGRAM

## 2023-07-11 PROCEDURE — 3008F PR BODY MASS INDEX (BMI) DOCUMENTED: ICD-10-PCS | Mod: CPTII,S$GLB,, | Performed by: STUDENT IN AN ORGANIZED HEALTH CARE EDUCATION/TRAINING PROGRAM

## 2023-07-11 PROCEDURE — 1160F PR REVIEW ALL MEDS BY PRESCRIBER/CLIN PHARMACIST DOCUMENTED: ICD-10-PCS | Mod: CPTII,S$GLB,, | Performed by: STUDENT IN AN ORGANIZED HEALTH CARE EDUCATION/TRAINING PROGRAM

## 2023-07-11 PROCEDURE — 3078F DIAST BP <80 MM HG: CPT | Mod: CPTII,S$GLB,, | Performed by: STUDENT IN AN ORGANIZED HEALTH CARE EDUCATION/TRAINING PROGRAM

## 2023-07-11 PROCEDURE — 3078F PR MOST RECENT DIASTOLIC BLOOD PRESSURE < 80 MM HG: ICD-10-PCS | Mod: CPTII,S$GLB,, | Performed by: STUDENT IN AN ORGANIZED HEALTH CARE EDUCATION/TRAINING PROGRAM

## 2023-07-11 PROCEDURE — 1159F PR MEDICATION LIST DOCUMENTED IN MEDICAL RECORD: ICD-10-PCS | Mod: CPTII,S$GLB,, | Performed by: STUDENT IN AN ORGANIZED HEALTH CARE EDUCATION/TRAINING PROGRAM

## 2023-07-11 PROCEDURE — 99385 PR PREVENTIVE VISIT,NEW,18-39: ICD-10-PCS | Mod: S$GLB,,, | Performed by: STUDENT IN AN ORGANIZED HEALTH CARE EDUCATION/TRAINING PROGRAM

## 2023-07-11 PROCEDURE — 3008F BODY MASS INDEX DOCD: CPT | Mod: CPTII,S$GLB,, | Performed by: STUDENT IN AN ORGANIZED HEALTH CARE EDUCATION/TRAINING PROGRAM

## 2023-07-11 RX ORDER — NORELGESTROMIN AND ETHINYL ESTRADIOL 35; 150 UG/MG; UG/MG
1 PATCH TRANSDERMAL WEEKLY
Qty: 4 PATCH | Refills: 11 | Status: SHIPPED | OUTPATIENT
Start: 2023-07-11 | End: 2024-01-04 | Stop reason: SDUPTHER

## 2023-07-11 NOTE — PROGRESS NOTES
Subjective:    Patient ID: Romana Hilario is a 27 y.o. y.o. female.     Chief Complaint: Annual Well Woman Exam     History of Present Illness:  Romana presents today for Annual Well Woman exam. She describes her menses as regular every month without intermenstrual spotting.She denies pelvic pain.  She denies breast tenderness, masses, nipple discharge. She denies difficulty with urination or bowel movements. She is sexually active. Contraception is by [facility].      Menstrual History:   Patient's last menstrual period was 2023..     OB History          2    Para   2    Term   2       0    AB   0    Living   2         SAB   0    IAB   0    Ectopic   0    Multiple   0    Live Births   2                 The following portions of the patient's history were reviewed and updated as appropriate: allergies, current medications, past family history, past medical history, past social history, past surgical history, and problem list.    ROS:   CONSTITUTIONAL: Negative for fever, chills, diaphoresis, weakness, fatigue, weight loss, weight gain  ENT: negative for sore throat, nasal congestion, nasal discharge, epistaxis, tinnitus, hearing loss  EYES: negative for blurry vision, decreased vision, loss of vision, eye pain, diplopia, photophobia, discharge  SKIN: Negative for rash, itching, hives  RESPIRATORY: negative for cough, hemoptysis, shortness of breath, pleuritic chest pain, wheezing  CARDIOVASCULAR: negative for chest pain, dyspnea on exertion, orthopnea, paroxysmal nocturnal dyspnea, edema, palpitations  BREAST: negative for breast  tenderness, breast mass, nipple discharge, or skin changes  GASTROINTESTINAL: negative for abdominal pain, flank pain, nausea, vomiting, diarrhea, constipation, black stool, blood in stool  GENITOURINARY: negative for abnormal vaginal bleeding, amenorrhea, decreased libido, dysuria, genital sores, hematuria, incontinence, menorrhagia, pelvic pain, urinary frequency,  vaginal discharge  HEMATOLOGIC/LYMPHATIC: negative for swollen lymph nodes, bleeding, bruising  MUSCULOSKELETAL: negative for back pain, joint pain, joint stiffness, joint swelling, muscle pain, muscle weakness  NEUROLOGICAL: negative for dizzy/vertigo, headache, focal weakness, numbness/tingling, speech problems, loss of consciousness, confusion, memory loss  BEHAVORIAL/PSYCH: negative for anxiety, depression, psychosis  ENDOCRINE: negative for polydipsia/polyuria, palpitations, skin changes, temperature intolerance, unexpected weight changes  ALLERGIC/IMMUNOLOGIC: negative for urticaria, hay fever, angioedema      Objective:    Vital Signs:  Vitals:    07/11/23 1527   BP: 114/72   Pulse: 96       Physical Exam:  General:  alert, cooperative, no distress   Skin:  Skin color, texture, turgor normal. No rashes or lesions   HEENT:  conjunctivae/corneas clear. PERRL.   Neck: supple, trachea midline, no adenopathy or thyromegally   Respiratory:  Normal effort   Breasts:  no discharge, erythema, tenderness, or palpable masses; no axillary lymphadenopathy   Abdomen:  soft, nontender, no palpable masses   Pelvis: External genitalia: normal general appearance  Urinary system: urethral meatus normal, bladder nontender  Vaginal: normal mucosa without prolapse or lesions  Cervix: normal appearance  Uterus: normal size, shape, position  Adnexa: normal size, nontender bilaterally   Extremities: Normal ROM; no edema, no cyanosis   Neurologial: Normal strength and tone. No focal numbness or weakness.   Psychiatric: normal mood, speech, dress, and thought processes     LABS:  3/4/2021     A1C:  Recent Labs   Lab 09/09/22  1227   Hemoglobin A1C 4.9     CBC:  Recent Labs   Lab 09/09/22  1227   WBC 9.73   RBC 4.55   Hemoglobin 14.1   Hematocrit 41.1   Platelets 268   MCV 90   MCH 31.0   MCHC 34.3     CMP:  Recent Labs   Lab 09/09/22  1227   Glucose 88   Calcium 9.1   Albumin 5.0   Total Protein 8.3   Sodium 141   Potassium 3.8   CO2  27   Chloride 100   BUN 12   Creatinine 0.77   Alkaline Phosphatase 54   ALT 18   AST 23   Total Bilirubin 0.6     LIPIDS:  Recent Labs   Lab 09/09/22  1227   TSH 1.370   HDL 61   Cholesterol 217 H   Triglycerides 162 H   LDL Cholesterol 123.6   HDL/Cholesterol Ratio 28.1   Non-HDL Cholesterol 156   Total Cholesterol/HDL Ratio 3.6     TSH:  Recent Labs   Lab 09/09/22  1227   TSH 1.370       Assessment:       Healthy female exam.     1. Encntr for gyn exam (general) (routine) w/o abn findings          Plan:      Problem List Items Addressed This Visit    None  Visit Diagnoses       Encntr for gyn exam (general) (routine) w/o abn findings    -  Primary    Relevant Medications    norelgestromin-ethinyl estradiol 150-35 mcg/24 hr            COUNSELING:  Romana was counseled on STD prevention, use and side-effects of various contraceptive measures, A.C.O.G. Pap guidelines and recommendations for yearly pelvic exams in addition to recommendations for monthly self breast exams; to see her PCP for other health maintenance.

## 2023-07-13 LAB — BACTERIA UR CULT: NO GROWTH

## 2023-08-04 ENCOUNTER — OFFICE VISIT (OUTPATIENT)
Dept: FAMILY MEDICINE | Facility: CLINIC | Age: 28
End: 2023-08-04
Payer: COMMERCIAL

## 2023-08-04 VITALS
BODY MASS INDEX: 18.37 KG/M2 | HEART RATE: 83 BPM | OXYGEN SATURATION: 98 % | SYSTOLIC BLOOD PRESSURE: 118 MMHG | WEIGHT: 114.31 LBS | DIASTOLIC BLOOD PRESSURE: 70 MMHG | TEMPERATURE: 99 F | HEIGHT: 66 IN

## 2023-08-04 DIAGNOSIS — J02.9 SORE THROAT: ICD-10-CM

## 2023-08-04 DIAGNOSIS — J01.00 ACUTE NON-RECURRENT MAXILLARY SINUSITIS: ICD-10-CM

## 2023-08-04 DIAGNOSIS — R53.83 FATIGUE, UNSPECIFIED TYPE: ICD-10-CM

## 2023-08-04 DIAGNOSIS — R05.1 ACUTE COUGH: ICD-10-CM

## 2023-08-04 LAB
CTP QC/QA: YES
CTP QC/QA: YES
FLUAV AG NPH QL: NEGATIVE
FLUBV AG NPH QL: NEGATIVE
SARS-COV-2 RDRP RESP QL NAA+PROBE: NEGATIVE

## 2023-08-04 PROCEDURE — 1159F MED LIST DOCD IN RCRD: CPT | Mod: CPTII,S$GLB,, | Performed by: STUDENT IN AN ORGANIZED HEALTH CARE EDUCATION/TRAINING PROGRAM

## 2023-08-04 PROCEDURE — 3008F BODY MASS INDEX DOCD: CPT | Mod: CPTII,S$GLB,, | Performed by: STUDENT IN AN ORGANIZED HEALTH CARE EDUCATION/TRAINING PROGRAM

## 2023-08-04 PROCEDURE — 87635: ICD-10-PCS | Mod: QW,S$GLB,, | Performed by: STUDENT IN AN ORGANIZED HEALTH CARE EDUCATION/TRAINING PROGRAM

## 2023-08-04 PROCEDURE — 99999 PR PBB SHADOW E&M-EST. PATIENT-LVL IV: ICD-10-PCS | Mod: PBBFAC,,, | Performed by: STUDENT IN AN ORGANIZED HEALTH CARE EDUCATION/TRAINING PROGRAM

## 2023-08-04 PROCEDURE — 99214 OFFICE O/P EST MOD 30 MIN: CPT | Mod: S$GLB,,, | Performed by: STUDENT IN AN ORGANIZED HEALTH CARE EDUCATION/TRAINING PROGRAM

## 2023-08-04 PROCEDURE — 99999 PR PBB SHADOW E&M-EST. PATIENT-LVL IV: CPT | Mod: PBBFAC,,, | Performed by: STUDENT IN AN ORGANIZED HEALTH CARE EDUCATION/TRAINING PROGRAM

## 2023-08-04 PROCEDURE — 3074F SYST BP LT 130 MM HG: CPT | Mod: CPTII,S$GLB,, | Performed by: STUDENT IN AN ORGANIZED HEALTH CARE EDUCATION/TRAINING PROGRAM

## 2023-08-04 PROCEDURE — 3008F PR BODY MASS INDEX (BMI) DOCUMENTED: ICD-10-PCS | Mod: CPTII,S$GLB,, | Performed by: STUDENT IN AN ORGANIZED HEALTH CARE EDUCATION/TRAINING PROGRAM

## 2023-08-04 PROCEDURE — 99214 PR OFFICE/OUTPT VISIT, EST, LEVL IV, 30-39 MIN: ICD-10-PCS | Mod: S$GLB,,, | Performed by: STUDENT IN AN ORGANIZED HEALTH CARE EDUCATION/TRAINING PROGRAM

## 2023-08-04 PROCEDURE — 1160F PR REVIEW ALL MEDS BY PRESCRIBER/CLIN PHARMACIST DOCUMENTED: ICD-10-PCS | Mod: CPTII,S$GLB,, | Performed by: STUDENT IN AN ORGANIZED HEALTH CARE EDUCATION/TRAINING PROGRAM

## 2023-08-04 PROCEDURE — 87635 SARS-COV-2 COVID-19 AMP PRB: CPT | Mod: QW,S$GLB,, | Performed by: STUDENT IN AN ORGANIZED HEALTH CARE EDUCATION/TRAINING PROGRAM

## 2023-08-04 PROCEDURE — 3078F DIAST BP <80 MM HG: CPT | Mod: CPTII,S$GLB,, | Performed by: STUDENT IN AN ORGANIZED HEALTH CARE EDUCATION/TRAINING PROGRAM

## 2023-08-04 PROCEDURE — 1159F PR MEDICATION LIST DOCUMENTED IN MEDICAL RECORD: ICD-10-PCS | Mod: CPTII,S$GLB,, | Performed by: STUDENT IN AN ORGANIZED HEALTH CARE EDUCATION/TRAINING PROGRAM

## 2023-08-04 PROCEDURE — 87804 POCT INFLUENZA A/B: ICD-10-PCS | Mod: 59,QW,S$GLB, | Performed by: STUDENT IN AN ORGANIZED HEALTH CARE EDUCATION/TRAINING PROGRAM

## 2023-08-04 PROCEDURE — 1160F RVW MEDS BY RX/DR IN RCRD: CPT | Mod: CPTII,S$GLB,, | Performed by: STUDENT IN AN ORGANIZED HEALTH CARE EDUCATION/TRAINING PROGRAM

## 2023-08-04 PROCEDURE — 3078F PR MOST RECENT DIASTOLIC BLOOD PRESSURE < 80 MM HG: ICD-10-PCS | Mod: CPTII,S$GLB,, | Performed by: STUDENT IN AN ORGANIZED HEALTH CARE EDUCATION/TRAINING PROGRAM

## 2023-08-04 PROCEDURE — 87804 INFLUENZA ASSAY W/OPTIC: CPT | Mod: 59,QW,S$GLB, | Performed by: STUDENT IN AN ORGANIZED HEALTH CARE EDUCATION/TRAINING PROGRAM

## 2023-08-04 PROCEDURE — 3074F PR MOST RECENT SYSTOLIC BLOOD PRESSURE < 130 MM HG: ICD-10-PCS | Mod: CPTII,S$GLB,, | Performed by: STUDENT IN AN ORGANIZED HEALTH CARE EDUCATION/TRAINING PROGRAM

## 2023-08-04 RX ORDER — FLUTICASONE PROPIONATE 50 MCG
2 SPRAY, SUSPENSION (ML) NASAL 2 TIMES DAILY
Qty: 18.2 ML | Refills: 1 | Status: SHIPPED | OUTPATIENT
Start: 2023-08-04

## 2023-08-04 RX ORDER — LEVOCETIRIZINE DIHYDROCHLORIDE 5 MG/1
5 TABLET, FILM COATED ORAL NIGHTLY
Qty: 90 TABLET | Refills: 3 | Status: SHIPPED | OUTPATIENT
Start: 2023-08-04 | End: 2024-08-03

## 2023-08-04 RX ORDER — DOXYCYCLINE 100 MG/1
100 CAPSULE ORAL 2 TIMES DAILY
Qty: 14 CAPSULE | Refills: 0 | Status: SHIPPED | OUTPATIENT
Start: 2023-08-04 | End: 2023-08-11

## 2023-08-04 NOTE — LETTER
August 4, 2023      [facility]  [address]  [place] [address]  Phone: [phone]  Fax: [phone]       Patient: Romana Hilario   YOB: 1995  Date of Visit: 08/04/2023    To Whom It May Concern:    [facility] Hilario  [facility]. The patient may return to work on 08/05/2023 with no restrictions. If you have any questions or concerns, or if I can be of further assistance, please do not hesitate to contact me.    Sincerely,    Keisha Means MA

## 2023-08-04 NOTE — PROGRESS NOTES
Subjective:       Patient ID: Romana Hilario is a 27 y.o. female.    Chief Complaint: Fatigue, Sinus Problem, Cough, and Generalized Body Aches      26F with h/o recurrent UTI (ESBL), SATISH and impaired hearing on hearing aids presents with sudden onset of non productive cough, nasal congestion/discharge, fatigue, diffuse myalgia, sore throat and sinus pain in the past 3 days. He daughter had similar symptoms  2 days prior to onset of symptoms as well. She denies any SOB, wheezing, diarrhea, recent travel. She has been using dayquil/nquil with no relief of symptoms necessitating her presentation today.     Past Medical History:   Diagnosis Date    Acute pyelonephritis in second trimester, antepartum     Depression     no meds    Recurrent UTI (urinary tract infection)        Past Surgical History:   Procedure Laterality Date    WISDOM TOOTH EXTRACTION         Family History   Problem Relation Age of Onset    Breast cancer Neg Hx     Colon cancer Neg Hx     Ovarian cancer Neg Hx        Social History     Socioeconomic History    Marital status: Single   Tobacco Use    Smoking status: Never     Passive exposure: Yes    Smokeless tobacco: Never   Substance and Sexual Activity    Alcohol use: Not Currently     Comment: social    Drug use: Never    Sexual activity: Yes     Partners: Male     Birth control/protection: Patch       Review of Systems   Constitutional:  Positive for fatigue. Negative for fever.   HENT:  Positive for congestion, postnasal drip, sinus pressure, sinus pain and sore throat. Negative for ear discharge and ear pain.    Respiratory:  Positive for cough. Negative for shortness of breath.    Endocrine: Negative for polydipsia and polyphagia.   Genitourinary:  Negative for dysuria.   Musculoskeletal:  Negative for back pain and gait problem.   Neurological:  Negative for seizures, weakness, light-headedness and headaches.   Psychiatric/Behavioral:  Negative for dysphoric mood. The patient is not  "nervous/anxious.          Objective:     Vitals:    08/04/23 0902 08/04/23 0913   BP: (!) 118/0 118/70   Pulse: 83    Temp: 98.5 °F (36.9 °C)    SpO2: 98%    Weight: 51.8 kg (114 lb 4.8 oz)    Height: 5' 6" (1.676 m)           Physical Exam  Vitals reviewed.   Constitutional:       General: She is not in acute distress.     Appearance: Normal appearance.   HENT:      Head: Normocephalic and atraumatic.      Right Ear: Tympanic membrane normal. There is no impacted cerumen.      Left Ear: Tympanic membrane normal. There is no impacted cerumen.   Eyes:      Extraocular Movements: Extraocular movements intact.      Conjunctiva/sclera: Conjunctivae normal.      Pupils: Pupils are equal, round, and reactive to light.   Cardiovascular:      Rate and Rhythm: Normal rate and regular rhythm.      Pulses: Normal pulses.      Heart sounds: Normal heart sounds.   Pulmonary:      Effort: Pulmonary effort is normal.      Breath sounds: Normal breath sounds. No wheezing.   Abdominal:      General: Abdomen is flat. Bowel sounds are normal.      Palpations: There is no mass.      Tenderness: There is no abdominal tenderness.   Musculoskeletal:      Cervical back: Normal range of motion.   Skin:     General: Skin is warm and dry.   Neurological:      General: No focal deficit present.      Mental Status: She is alert and oriented to person, place, and time.      Cranial Nerves: Cranial nerve deficit: Bilateral hand tremors.      Motor: No weakness.      Gait: Gait normal.   Psychiatric:         Mood and Affect: Mood normal.         Behavior: Behavior normal.           Laboratory:  CBC:  No results for input(s): "WBC", "RBC", "HGB", "HCT", "PLT", "MCV", "MCH", "MCHC" in the last 2160 hours.    CMP:  No results for input(s): "GLU", "CALCIUM", "ALBUMIN", "PROT", "NA", "K", "CO2", "CL", "BUN", "ALKPHOS", "ALT", "AST", "BILITOT" in the last 2160 hours.    Invalid input(s): "CREATININ"    URINALYSIS:  Recent Labs   Lab Result Units " "07/11/23  1552   Color, UA  Tami   Clarity, UA  Clear   Spec Grav UA  1.010   pH, UA  7   Nitrite, UA  negative   Urobilinogen, UA  negative      LIPIDS:  No results for input(s): "TSH", "HDL", "CHOL", "TRIG", "LDLCALC", "CHOLHDL", "NONHDLCHOL", "TOTALCHOLEST" in the last 2160 hours.    TSH:  No results for input(s): "TSH" in the last 2160 hours.    A1C:  No results for input(s): "HGBA1C" in the last 2160 hours.    Assessment:         ICD-10-CM ICD-9-CM   1. Acute non-recurrent maxillary sinusitis  J01.00 461.0   2. Acute cough  R05.1 786.2   3. Sore throat  J02.9 462   4. Fatigue, unspecified type  R53.83 780.79       Plan:         Acute non-recurrent maxillary sinusitis  -likely precipitated by recent viral URI  -doxycycline 100mg BID X7days    Acute cough  -likely 2/2 other viral URI  -POCT flu A/B, COVID ordered-negative  -robitussin OTC PRN    3. Sore throat with diffuse malgia/fatigue  -POCT flu, COVID-negative  -flonase BID X7days, then daily  -xyzal nightly   -tylenol PRN    Patient's Medications   New Prescriptions    DOXYCYCLINE (VIBRAMYCIN) 100 MG CAP    Take 1 capsule (100 mg total) by mouth 2 (two) times daily. for 7 days    FLUTICASONE PROPIONATE (FLONASE) 50 MCG/ACTUATION NASAL SPRAY    2 sprays (100 mcg total) by Each Nostril route 2 (two) times a day.    LEVOCETIRIZINE (XYZAL) 5 MG TABLET    Take 1 tablet (5 mg total) by mouth every evening.   Previous Medications    NORELGESTROMIN-ETHINYL ESTRADIOL 150-35 MCG/24 HR    Place 1 patch onto the skin once a week.   Modified Medications    No medications on file   Discontinued Medications    No medications on file       Dr Radha Bailon MD      "

## 2023-08-04 NOTE — LETTER
August 4, 2023      [facility]  [address]  [place] [address]  Phone: [phone]  Fax: [phone]       Patient: Romana Hilario   YOB: 1995  Date of Visit: 08/04/2023    To Whom It May Concern:    Gerardo Hilario  was at [facility] [employer] on 08/04/2023. The patient may return to work on 08/05/2023 with no restrictions. If you have any questions or concerns, or if I can be of further assistance, please do not hesitate to contact me.    Sincerely,      Radha Bailon M.D.

## 2023-09-14 ENCOUNTER — OFFICE VISIT (OUTPATIENT)
Dept: OBSTETRICS AND GYNECOLOGY | Facility: CLINIC | Age: 28
End: 2023-09-14
Payer: COMMERCIAL

## 2023-09-14 VITALS
SYSTOLIC BLOOD PRESSURE: 96 MMHG | HEART RATE: 97 BPM | HEIGHT: 66 IN | WEIGHT: 111.75 LBS | BODY MASS INDEX: 17.96 KG/M2 | DIASTOLIC BLOOD PRESSURE: 64 MMHG

## 2023-09-14 DIAGNOSIS — R10.2 PELVIC PAIN: Primary | ICD-10-CM

## 2023-09-14 PROCEDURE — 3008F PR BODY MASS INDEX (BMI) DOCUMENTED: ICD-10-PCS | Mod: CPTII,S$GLB,, | Performed by: STUDENT IN AN ORGANIZED HEALTH CARE EDUCATION/TRAINING PROGRAM

## 2023-09-14 PROCEDURE — 99213 OFFICE O/P EST LOW 20 MIN: CPT | Mod: S$GLB,,, | Performed by: STUDENT IN AN ORGANIZED HEALTH CARE EDUCATION/TRAINING PROGRAM

## 2023-09-14 PROCEDURE — 3078F PR MOST RECENT DIASTOLIC BLOOD PRESSURE < 80 MM HG: ICD-10-PCS | Mod: CPTII,S$GLB,, | Performed by: STUDENT IN AN ORGANIZED HEALTH CARE EDUCATION/TRAINING PROGRAM

## 2023-09-14 PROCEDURE — 3074F PR MOST RECENT SYSTOLIC BLOOD PRESSURE < 130 MM HG: ICD-10-PCS | Mod: CPTII,S$GLB,, | Performed by: STUDENT IN AN ORGANIZED HEALTH CARE EDUCATION/TRAINING PROGRAM

## 2023-09-14 PROCEDURE — 3078F DIAST BP <80 MM HG: CPT | Mod: CPTII,S$GLB,, | Performed by: STUDENT IN AN ORGANIZED HEALTH CARE EDUCATION/TRAINING PROGRAM

## 2023-09-14 PROCEDURE — 1159F MED LIST DOCD IN RCRD: CPT | Mod: CPTII,S$GLB,, | Performed by: STUDENT IN AN ORGANIZED HEALTH CARE EDUCATION/TRAINING PROGRAM

## 2023-09-14 PROCEDURE — 1159F PR MEDICATION LIST DOCUMENTED IN MEDICAL RECORD: ICD-10-PCS | Mod: CPTII,S$GLB,, | Performed by: STUDENT IN AN ORGANIZED HEALTH CARE EDUCATION/TRAINING PROGRAM

## 2023-09-14 PROCEDURE — 99999 PR PBB SHADOW E&M-EST. PATIENT-LVL III: ICD-10-PCS | Mod: PBBFAC,,, | Performed by: STUDENT IN AN ORGANIZED HEALTH CARE EDUCATION/TRAINING PROGRAM

## 2023-09-14 PROCEDURE — 99213 PR OFFICE/OUTPT VISIT, EST, LEVL III, 20-29 MIN: ICD-10-PCS | Mod: S$GLB,,, | Performed by: STUDENT IN AN ORGANIZED HEALTH CARE EDUCATION/TRAINING PROGRAM

## 2023-09-14 PROCEDURE — 3008F BODY MASS INDEX DOCD: CPT | Mod: CPTII,S$GLB,, | Performed by: STUDENT IN AN ORGANIZED HEALTH CARE EDUCATION/TRAINING PROGRAM

## 2023-09-14 PROCEDURE — 99999 PR PBB SHADOW E&M-EST. PATIENT-LVL III: CPT | Mod: PBBFAC,,, | Performed by: STUDENT IN AN ORGANIZED HEALTH CARE EDUCATION/TRAINING PROGRAM

## 2023-09-14 PROCEDURE — 3074F SYST BP LT 130 MM HG: CPT | Mod: CPTII,S$GLB,, | Performed by: STUDENT IN AN ORGANIZED HEALTH CARE EDUCATION/TRAINING PROGRAM

## 2023-09-14 NOTE — PROGRESS NOTES
Subjective:       Patient ID: Romana Hilario is a 27 y.o. female.    Chief Complaint:  Painful Ashby      History of Present Illness  Patient is a 28 yo  that is presenting for pain with intercourse that she first noticed one month ago. She reports that it hurt during intercourse and then she awoke the next morning with severe right sided pain. She had one episode of intercourse in between that was not painful. Then two weeks ago, she had the same thing happen but this time she had to stop having sex because it was so uncomfortable.           GYN & OB History  Patient's last menstrual period was 2023.   Date of Last Pap: 3/4/2021    OB History    Para Term  AB Living   2 2 2 0 0 2   SAB IAB Ectopic Multiple Live Births   0 0 0 0 2      # Outcome Date GA Lbr Shabbir/2nd Weight Sex Delivery Anes PTL Lv   2 Term 20 38w6d  3.232 kg (7 lb 2 oz) M Vag-Spont   ANIA   1 Term 17 40w0d / 00:35 3.012 kg (6 lb 10.2 oz) F Vag-Spont Spinal, EPI N ANIA       Review of Systems  Review of Systems   Constitutional:  Negative for chills, fatigue, fever and unexpected weight change.   HENT:  Negative for congestion, sinus pain and sore throat.    Eyes:  Negative for visual disturbance.   Respiratory:  Negative for cough, chest tightness and shortness of breath.    Cardiovascular:  Negative for chest pain and palpitations.   Gastrointestinal:  Negative for abdominal pain, constipation, diarrhea, nausea and vomiting.   Genitourinary:  Positive for pelvic pain and vaginal pain. Negative for menstrual problem, vaginal bleeding and vaginal discharge.   Musculoskeletal:  Negative for myalgias.   Skin:  Negative for color change, pallor and rash.   Neurological:  Negative for dizziness, light-headedness and headaches.   Psychiatric/Behavioral:  The patient is not nervous/anxious.            Objective:    Physical Exam:   Constitutional: She is oriented to person, place, and time. She appears  well-nourished. No distress.    HENT:   Head: Normocephalic and atraumatic.    Eyes: Pupils are equal, round, and reactive to light. EOM are normal.     Cardiovascular:  Normal rate.             Pulmonary/Chest: Effort normal. No respiratory distress.        Abdominal: Soft and flat. There is abdominal tenderness in the right lower quadrant, suprapubic area and left lower quadrant. There is no guarding.                 Neurological: She is alert and oriented to person, place, and time.    Skin: Skin is warm and dry. No erythema.    Psychiatric: She has a normal mood and affect. Her behavior is normal. Judgment and thought content normal.          Assessment:        1. Pelvic pain                Plan:      Romana was seen today for painful intercourse.    Diagnoses and all orders for this visit:    Pelvic pain  -     US Transvaginal Non OB; Future

## 2023-09-18 ENCOUNTER — PATIENT MESSAGE (OUTPATIENT)
Dept: OBSTETRICS AND GYNECOLOGY | Facility: CLINIC | Age: 28
End: 2023-09-18
Payer: COMMERCIAL

## 2023-09-26 DIAGNOSIS — R63.4 WEIGHT LOSS: Primary | ICD-10-CM

## 2023-09-26 DIAGNOSIS — R10.9 ABDOMINAL PAIN, UNSPECIFIED ABDOMINAL LOCATION: ICD-10-CM

## 2023-10-24 ENCOUNTER — OFFICE VISIT (OUTPATIENT)
Dept: GASTROENTEROLOGY | Facility: CLINIC | Age: 28
End: 2023-10-24
Payer: COMMERCIAL

## 2023-10-24 VITALS
BODY MASS INDEX: 18.46 KG/M2 | SYSTOLIC BLOOD PRESSURE: 102 MMHG | HEIGHT: 66 IN | HEART RATE: 69 BPM | OXYGEN SATURATION: 99 % | DIASTOLIC BLOOD PRESSURE: 62 MMHG | WEIGHT: 114.88 LBS

## 2023-10-24 DIAGNOSIS — R10.30 LOWER ABDOMINAL PAIN: Primary | ICD-10-CM

## 2023-10-24 DIAGNOSIS — K59.04 CHRONIC IDIOPATHIC CONSTIPATION: ICD-10-CM

## 2023-10-24 DIAGNOSIS — R63.4 WEIGHT LOSS: ICD-10-CM

## 2023-10-24 PROCEDURE — 3074F PR MOST RECENT SYSTOLIC BLOOD PRESSURE < 130 MM HG: ICD-10-PCS | Mod: CPTII,S$GLB,, | Performed by: NURSE PRACTITIONER

## 2023-10-24 PROCEDURE — 99204 PR OFFICE/OUTPT VISIT, NEW, LEVL IV, 45-59 MIN: ICD-10-PCS | Mod: S$GLB,,, | Performed by: NURSE PRACTITIONER

## 2023-10-24 PROCEDURE — 1160F PR REVIEW ALL MEDS BY PRESCRIBER/CLIN PHARMACIST DOCUMENTED: ICD-10-PCS | Mod: CPTII,S$GLB,, | Performed by: NURSE PRACTITIONER

## 2023-10-24 PROCEDURE — 3008F BODY MASS INDEX DOCD: CPT | Mod: CPTII,S$GLB,, | Performed by: NURSE PRACTITIONER

## 2023-10-24 PROCEDURE — 99999 PR PBB SHADOW E&M-EST. PATIENT-LVL IV: CPT | Mod: PBBFAC,,, | Performed by: NURSE PRACTITIONER

## 2023-10-24 PROCEDURE — 3008F PR BODY MASS INDEX (BMI) DOCUMENTED: ICD-10-PCS | Mod: CPTII,S$GLB,, | Performed by: NURSE PRACTITIONER

## 2023-10-24 PROCEDURE — 3074F SYST BP LT 130 MM HG: CPT | Mod: CPTII,S$GLB,, | Performed by: NURSE PRACTITIONER

## 2023-10-24 PROCEDURE — 99204 OFFICE O/P NEW MOD 45 MIN: CPT | Mod: S$GLB,,, | Performed by: NURSE PRACTITIONER

## 2023-10-24 PROCEDURE — 1159F MED LIST DOCD IN RCRD: CPT | Mod: CPTII,S$GLB,, | Performed by: NURSE PRACTITIONER

## 2023-10-24 PROCEDURE — 3078F PR MOST RECENT DIASTOLIC BLOOD PRESSURE < 80 MM HG: ICD-10-PCS | Mod: CPTII,S$GLB,, | Performed by: NURSE PRACTITIONER

## 2023-10-24 PROCEDURE — 1159F PR MEDICATION LIST DOCUMENTED IN MEDICAL RECORD: ICD-10-PCS | Mod: CPTII,S$GLB,, | Performed by: NURSE PRACTITIONER

## 2023-10-24 PROCEDURE — 3078F DIAST BP <80 MM HG: CPT | Mod: CPTII,S$GLB,, | Performed by: NURSE PRACTITIONER

## 2023-10-24 PROCEDURE — 1160F RVW MEDS BY RX/DR IN RCRD: CPT | Mod: CPTII,S$GLB,, | Performed by: NURSE PRACTITIONER

## 2023-10-24 PROCEDURE — 99999 PR PBB SHADOW E&M-EST. PATIENT-LVL IV: ICD-10-PCS | Mod: PBBFAC,,, | Performed by: NURSE PRACTITIONER

## 2023-10-24 RX ORDER — DICYCLOMINE HYDROCHLORIDE 10 MG/1
10 CAPSULE ORAL 3 TIMES DAILY PRN
Qty: 60 CAPSULE | Refills: 2 | Status: SHIPPED | OUTPATIENT
Start: 2023-10-24

## 2023-10-24 NOTE — LETTER
October 24, 2023      [facility]  [address]   [place] [address]  Phone: [phone]  Fax: [phone]       Patient: Romana Hilario   YOB: 1995  Date of Visit: 10/24/2023    To Whom It May Concern:    Gerardo Hilario  was at [facility] on 10/24/2023. The patient may return to work/school on 10/24/2023 with no restrictions. If you have any questions or concerns, or if I can be of further assistance, please do not hesitate to contact me.    Sincerely,    MIRIAN Salomon

## 2023-10-24 NOTE — PROGRESS NOTES
"Subjective:       Patient ID: Romana Hilario is a 28 y.o. female.    Chief Complaint: Constipation, Abdominal Pain, and Bloated    27 y/o female referred by GYN for abdominal pain, constipation and weight loss. She reports symptoms have progressively worsened over the last year. She is constipated. Has BM every 3-4 days. Has lower abdominal/pelvic pain described as fullness and cramping. Feels bloated after eating. No nausea or vomiting. Has decreased appetite with ~5 lbs weight loss over the last 2-3 months. Does feel like symptoms are triggered by job stress. Has tried Tylenol and ibuprofen for symptoms without relief.         Past Medical History:   Diagnosis Date    Acute pyelonephritis in second trimester, antepartum     Depression     no meds    Recurrent UTI (urinary tract infection)        Past Surgical History:   Procedure Laterality Date    WISDOM TOOTH EXTRACTION         Family History   Problem Relation Age of Onset    Breast cancer Neg Hx     Colon cancer Neg Hx     Ovarian cancer Neg Hx        Social History     Socioeconomic History    Marital status: Single   Tobacco Use    Smoking status: Never     Passive exposure: Yes    Smokeless tobacco: Never   Substance and Sexual Activity    Alcohol use: Not Currently     Comment: social    Drug use: Never    Sexual activity: Yes     Partners: Male     Birth control/protection: Patch       Review of Systems   Constitutional:  Positive for unexpected weight change.   HENT:  Negative for trouble swallowing.    Respiratory:  Negative for shortness of breath.    Cardiovascular:  Negative for chest pain.   Gastrointestinal:  Positive for abdominal pain and constipation.   Psychiatric/Behavioral:  Negative for dysphoric mood.          Objective:     Vitals:    10/24/23 0834   BP: 102/62   BP Location: Left arm   Patient Position: Sitting   BP Method: Medium (Manual)   Pulse: 69   SpO2: 99%   Weight: 52.1 kg (114 lb 13.8 oz)   Height: 5' 6" (1.676 m)        "   Physical Exam  Constitutional:       General: She is not in acute distress.  HENT:      Head: Normocephalic.   Eyes:      Conjunctiva/sclera: Conjunctivae normal.   Pulmonary:      Effort: Pulmonary effort is normal. No respiratory distress.   Musculoskeletal:         General: Normal range of motion.      Cervical back: Normal range of motion.   Skin:     General: Skin is warm and dry.   Neurological:      Mental Status: She is alert and oriented to person, place, and time.   Psychiatric:         Mood and Affect: Mood normal.         Behavior: Behavior normal.     Xray of abdomen was independently visualized and reviewed by me and showed moderate amount of stool throughout the colon.            Assessment:         ICD-10-CM ICD-9-CM   1. Lower abdominal pain  R10.30 789.09   2. Chronic idiopathic constipation  K59.04 564.00   3. Weight loss  R63.4 783.21       Plan:       Lower abdominal pain  -     Ambulatory referral/consult to Gastroenterology  -     X-Ray Abdomen Flat And Erect; Future; Expected date: 10/24/2023  -     Pregnancy, urine rapid; Future  -     dicyclomine (BENTYL) 10 MG capsule; Take 1 capsule (10 mg total) by mouth 3 (three) times daily as needed (abdominal pain).  Dispense: 60 capsule; Refill: 2    Chronic idiopathic constipation          -    Dul/MOM clean out then start Miralax and fiber supplement daily    Weight loss  -     Recommend keeping food diary. Goal of 1800 - 2000 calories per day       Follow up in 1 month (on 11/24/2023).     Patient's Medications   New Prescriptions    DICYCLOMINE (BENTYL) 10 MG CAPSULE    Take 1 capsule (10 mg total) by mouth 3 (three) times daily as needed (abdominal pain).   Previous Medications    FLUTICASONE PROPIONATE (FLONASE) 50 MCG/ACTUATION NASAL SPRAY    2 sprays (100 mcg total) by Each Nostril route 2 (two) times a day.    LEVOCETIRIZINE (XYZAL) 5 MG TABLET    Take 1 tablet (5 mg total) by mouth every evening.    NORELGESTROMIN-ETHINYL ESTRADIOL  150-35 MCG/24 HR    Place 1 patch onto the skin once a week.   Modified Medications    No medications on file   Discontinued Medications    No medications on file

## 2023-11-28 ENCOUNTER — TELEPHONE (OUTPATIENT)
Dept: NEUROLOGY | Facility: CLINIC | Age: 28
End: 2023-11-28
Payer: COMMERCIAL

## 2023-11-28 ENCOUNTER — ON-DEMAND VIRTUAL (OUTPATIENT)
Dept: URGENT CARE | Facility: CLINIC | Age: 28
End: 2023-11-28
Payer: COMMERCIAL

## 2023-11-28 DIAGNOSIS — R51.9 ACUTE INTRACTABLE HEADACHE, UNSPECIFIED HEADACHE TYPE: Primary | ICD-10-CM

## 2023-11-28 PROCEDURE — 99212 OFFICE O/P EST SF 10 MIN: CPT | Mod: 95,,, | Performed by: INTERNAL MEDICINE

## 2023-11-28 PROCEDURE — 99212 PR OFFICE/OUTPT VISIT, EST, LEVL II, 10-19 MIN: ICD-10-PCS | Mod: 95,,, | Performed by: INTERNAL MEDICINE

## 2023-11-28 RX ORDER — BUTALBITAL, ACETAMINOPHEN AND CAFFEINE 50; 325; 40 MG/1; MG/1; MG/1
1 TABLET ORAL EVERY 4 HOURS PRN
Qty: 30 TABLET | Refills: 0 | Status: SHIPPED | OUTPATIENT
Start: 2023-11-28 | End: 2023-12-28

## 2023-11-28 NOTE — TELEPHONE ENCOUNTER
----- Message from Kasia Fernandez sent at 11/28/2023 11:16 AM CST -----  Regarding: Appt  Contact: Pt  [phone]  Pt is calling to schedule a appt pt will be a np states she has been suffering with migraines please call

## 2023-11-28 NOTE — PROGRESS NOTES
Subjective:      Patient ID: Romana Hilario is a 28 y.o. female.    Vitals:  vitals were not taken for this visit.     Chief Complaint: Headache      Visit Type: TELE AUDIOVISUAL    Present with the patient at the time of consultation: TELEMED PRESENT WITH PATIENT: None    Past Medical History:   Diagnosis Date    Acute pyelonephritis in second trimester, antepartum     Depression     no meds    Recurrent UTI (urinary tract infection)      Past Surgical History:   Procedure Laterality Date    WISDOM TOOTH EXTRACTION       Review of patient's allergies indicates:  No Known Allergies  Current Outpatient Medications on File Prior to Visit   Medication Sig Dispense Refill    dicyclomine (BENTYL) 10 MG capsule Take 1 capsule (10 mg total) by mouth 3 (three) times daily as needed (abdominal pain). 60 capsule 2    fluticasone propionate (FLONASE) 50 mcg/actuation nasal spray 2 sprays (100 mcg total) by Each Nostril route 2 (two) times a day. (Patient not taking: Reported on 9/14/2023) 18.2 mL 1    levocetirizine (XYZAL) 5 MG tablet Take 1 tablet (5 mg total) by mouth every evening. (Patient not taking: Reported on 9/14/2023) 90 tablet 3    norelgestromin-ethinyl estradiol 150-35 mcg/24 hr Place 1 patch onto the skin once a week. 4 patch 11     No current facility-administered medications on file prior to visit.     Family History   Problem Relation Age of Onset    Breast cancer Neg Hx     Colon cancer Neg Hx     Ovarian cancer Neg Hx            Ohs Peq Odvv Intake    11/28/2023  2:12 PM CST - Filed by Patient   Describe your reason for todays visit Migraines   What is your current physical address in the event of a medical emergency? [address]   Are you able to take your vital signs? No   Please attach any relevant images or files          In [place] via video conference.     27 y/o woman with a history of migraines once a month with her menses. She is seeking pain medications stronger than what is available  over the counter.     Headache   Her past medical history is significant for migraine headaches.       Neurological:  Positive for headaches and history of migraines. Negative for altered mental status and loss of consciousness.   Psychiatric/Behavioral:  Negative for altered mental status.         Objective:   The physical exam was conducted virtually.  Physical Exam   Constitutional:  Non-toxic appearance. No distress.   Neurological: no focal deficit. She is alert and at baseline.     No other pertinent findings on examination.   Assessment:     1. Acute intractable headache, unspecified headache type        Plan:       Acute intractable headache, unspecified headache type    Unfortunately, on this virtual platform we cannot prescribe anything stronger than ibuprofen. You will need to contact your primary or go into urgent care for other types of therapy.

## 2023-11-28 NOTE — TELEPHONE ENCOUNTER
Contacted pt.  States she has migraines mthly with her cycle.  Pt is having to miss work d/t migraines.  States she has been  with this migraine x 2-3 days.  No relief with otc meds.  Please advise.

## 2023-11-28 NOTE — TELEPHONE ENCOUNTER
----- Message from Maeve Angela sent at 11/28/2023  2:22 PM CST -----  Type:  Migraines    Who Called:pt  Does the patient know what this is regarding?:migraines  Would the patient rather a call back or a response via [facility]? call  Best Call Back Number: [phone]  Additional Information: Pt stated she is having severe migraines every month with her cycle.  Is there something you can do to help get rid of the pain.

## 2023-11-29 ENCOUNTER — TELEPHONE (OUTPATIENT)
Dept: NEUROLOGY | Facility: CLINIC | Age: 28
End: 2023-11-29
Payer: COMMERCIAL

## 2023-11-29 NOTE — TELEPHONE ENCOUNTER
Called pt with no answer.  Dr Slaughter does not do Migraines.  Patient can contact the office back if she has any questions.

## 2024-01-04 DIAGNOSIS — Z01.419 ENCNTR FOR GYN EXAM (GENERAL) (ROUTINE) W/O ABN FINDINGS: ICD-10-CM

## 2024-01-04 RX ORDER — NORELGESTROMIN AND ETHINYL ESTRADIOL 35; 150 UG/MG; UG/MG
1 PATCH TRANSDERMAL WEEKLY
Qty: 4 PATCH | Refills: 6 | Status: SHIPPED | OUTPATIENT
Start: 2024-01-04

## 2024-01-04 NOTE — TELEPHONE ENCOUNTER
Romana desire refill of norelgestromin-ethinyl estradiol patch. Last Annual 2023.  Patient Active Problem List   Diagnosis    Family history of congenital heart defect -FOB sister  at 4 days, will need fetal echo    Left sided abdominal pain    Pyelonephritis    Abdominal pain complicating pregnancy    Abdominal pain    Tremor of both hands    Impacted cerumen    Bilateral impacted cerumen    Anxiety disorder    History of recurrent UTI (urinary tract infection)    Arthralgia of both hands    Low vitamin D level    Need for hepatitis C screening test    Missed menses    Pure hypercholesterolemia     Prior to Admission medications    Medication Sig Start Date End Date Taking? Authorizing Provider   dicyclomine (BENTYL) 10 MG capsule Take 1 capsule (10 mg total) by mouth 3 (three) times daily as needed (abdominal pain). 10/24/23   Cristóbal Chavez FNP   fluticasone propionate (FLONASE) 50 mcg/actuation nasal spray 2 sprays (100 mcg total) by Each Nostril route 2 (two) times a day.  Patient not taking: Reported on 2023   Radha Bailon MD   levocetirizine (XYZAL) 5 MG tablet Take 1 tablet (5 mg total) by mouth every evening.  Patient not taking: Reported on 2023 8/4/23 8/3/24  Radha Bailon MD   norelgestromin-ethinyl estradiol 150-35 mcg/24 hr Place 1 patch onto the skin once a week. 23   Caryn Chaves MD

## 2024-01-05 ENCOUNTER — TELEPHONE (OUTPATIENT)
Dept: OBSTETRICS AND GYNECOLOGY | Facility: CLINIC | Age: 29
End: 2024-01-05

## 2024-01-05 ENCOUNTER — OFFICE VISIT (OUTPATIENT)
Dept: OBSTETRICS AND GYNECOLOGY | Facility: CLINIC | Age: 29
End: 2024-01-05
Payer: COMMERCIAL

## 2024-01-05 ENCOUNTER — PATIENT MESSAGE (OUTPATIENT)
Dept: OBSTETRICS AND GYNECOLOGY | Facility: CLINIC | Age: 29
End: 2024-01-05
Payer: COMMERCIAL

## 2024-01-05 VITALS
BODY MASS INDEX: 18.48 KG/M2 | WEIGHT: 115 LBS | HEIGHT: 66 IN | HEART RATE: 104 BPM | SYSTOLIC BLOOD PRESSURE: 104 MMHG | DIASTOLIC BLOOD PRESSURE: 64 MMHG

## 2024-01-05 DIAGNOSIS — N89.8 VAGINAL ODOR: Primary | ICD-10-CM

## 2024-01-05 DIAGNOSIS — R35.0 FREQUENT URINATION: ICD-10-CM

## 2024-01-05 PROCEDURE — 1159F MED LIST DOCD IN RCRD: CPT | Mod: CPTII,S$GLB,, | Performed by: OBSTETRICS & GYNECOLOGY

## 2024-01-05 PROCEDURE — 3074F SYST BP LT 130 MM HG: CPT | Mod: CPTII,S$GLB,, | Performed by: OBSTETRICS & GYNECOLOGY

## 2024-01-05 PROCEDURE — 3078F DIAST BP <80 MM HG: CPT | Mod: CPTII,S$GLB,, | Performed by: OBSTETRICS & GYNECOLOGY

## 2024-01-05 PROCEDURE — 1160F RVW MEDS BY RX/DR IN RCRD: CPT | Mod: CPTII,S$GLB,, | Performed by: OBSTETRICS & GYNECOLOGY

## 2024-01-05 PROCEDURE — 99999 PR PBB SHADOW E&M-EST. PATIENT-LVL III: CPT | Mod: PBBFAC,,, | Performed by: OBSTETRICS & GYNECOLOGY

## 2024-01-05 PROCEDURE — 3008F BODY MASS INDEX DOCD: CPT | Mod: CPTII,S$GLB,, | Performed by: OBSTETRICS & GYNECOLOGY

## 2024-01-05 PROCEDURE — 99213 OFFICE O/P EST LOW 20 MIN: CPT | Mod: S$GLB,,, | Performed by: OBSTETRICS & GYNECOLOGY

## 2024-01-05 PROCEDURE — 87086 URINE CULTURE/COLONY COUNT: CPT | Performed by: OBSTETRICS & GYNECOLOGY

## 2024-01-05 PROCEDURE — 81514 NFCT DS BV&VAGINITIS DNA ALG: CPT | Performed by: OBSTETRICS & GYNECOLOGY

## 2024-01-05 NOTE — TELEPHONE ENCOUNTER
----- Message from Soledad Rolle sent at 1/5/2024  3:16 PM CST -----  Contact: Self  Romana Hilario  MRN: [id number]  Home Phone      [phone]  Work Phone      Not on file.  Mobile          [phone]  Home Phone      [phone]  Mobile          [phone]    Patient Care Team:  Radha Bailon MD as PCP - General (Internal Medicine)  Deb Martines MA as Care Coordinator  Skyla Pitts MD (Obstetrics and Gynecology)  OB? No  What phone number can you be reached at? [phone]  Message: pt states if she does have a UTI, Cipro doesn't work for her

## 2024-01-05 NOTE — TELEPHONE ENCOUNTER
Pt wanted to inform you that cipro doesn't work for her if she does have a uti when her results come back in.

## 2024-01-05 NOTE — PROGRESS NOTES
Subjective:       Patient ID: Romana Hilario is a 28 y.o. female.    Chief Complaint:  vaginal odor (Pt states having vaginal odor going on about one week. Pt states not knowing if its her urine or vaginal area.)      History of Present Illness  Patient presents wanting to have her urine checked.  Patient states she has been diagnosed with a bladder infection before which is not having any symptoms.  She admits to some slight frequency and urgency.  Patient also states she noticed vaginal odor within the last day.  She also had a brownish discharge.  She denies any itching.    Menstrual History:  OB History          2    Para   2    Term   2       0    AB   0    Living   2         SAB   0    IAB   0    Ectopic   0    Multiple   0    Live Births   2                Menarche age:  Patient's last menstrual period was 2023.         Review of Systems  Review of Systems   Constitutional:  Positive for appetite change. Negative for activity change, chills, diaphoresis, fatigue, fever and unexpected weight change.   HENT:  Negative for congestion, dental problem, drooling, ear discharge, ear pain, facial swelling, hearing loss, mouth sores, nosebleeds, postnasal drip, rhinorrhea, sinus pressure, sneezing, sore throat, tinnitus, trouble swallowing and voice change.    Eyes:  Negative for photophobia, pain, discharge, redness, itching and visual disturbance.   Respiratory:  Negative for apnea, cough, choking, chest tightness, shortness of breath, wheezing and stridor.    Cardiovascular:  Negative for chest pain, palpitations and leg swelling.   Gastrointestinal:  Negative for abdominal distention, abdominal pain, anal bleeding, blood in stool, constipation, diarrhea, nausea, rectal pain and vomiting.   Endocrine: Negative for cold intolerance, heat intolerance, polydipsia, polyphagia and polyuria.   Genitourinary:  Positive for dyspareunia, frequency, urgency, vaginal discharge and vaginal pain. Negative for  decreased urine volume, difficulty urinating, dysuria, enuresis, flank pain, genital sores, hematuria, menstrual problem, pelvic pain and vaginal bleeding.   Musculoskeletal:  Negative for arthralgias, back pain, gait problem, joint swelling, myalgias, neck pain and neck stiffness.   Skin:  Negative for color change, pallor, rash and wound.   Allergic/Immunologic: Negative for environmental allergies, food allergies and immunocompromised state.   Neurological:  Negative for dizziness, tremors, seizures, syncope, facial asymmetry, speech difficulty, weakness, light-headedness, numbness and headaches.   Hematological:  Negative for adenopathy. Does not bruise/bleed easily.   Psychiatric/Behavioral:  Negative for agitation, behavioral problems, confusion, decreased concentration, dysphoric mood, hallucinations, self-injury, sleep disturbance and suicidal ideas. The patient is not nervous/anxious and is not hyperactive.          Objective:      Physical Exam  Vitals and nursing note reviewed. Exam conducted with a chaperone present.   Abdominal:      Hernia: There is no hernia in the left inguinal area or right inguinal area.   Genitourinary:     General: Normal vulva.      Labia:         Right: No rash, tenderness, lesion or injury.         Left: No rash, tenderness, lesion or injury.       Urethra: No prolapse, urethral pain, urethral swelling or urethral lesion.      Vagina: No signs of injury and foreign body. No vaginal discharge, erythema, tenderness or bleeding.      Cervix: No cervical motion tenderness, discharge or friability.      Uterus: Not deviated, not enlarged, not fixed and not tender.       Adnexa:         Right: No mass, tenderness or fullness.          Left: No mass, tenderness or fullness.        Rectum: No external hemorrhoid.   Lymphadenopathy:      Lower Body: No right inguinal adenopathy. No left inguinal adenopathy.         Assessment:        1. Vaginal odor                Plan:         Romana  was seen today for vaginal odor.    Diagnoses and all orders for this visit:    Vaginal odor  -     Vaginosis Screen by DNA Probe

## 2024-01-07 LAB — BACTERIA UR CULT: NORMAL

## 2024-01-08 LAB
BACTERIAL VAGINOSIS DNA: POSITIVE
CANDIDA GLABRATA DNA: POSITIVE
CANDIDA KRUSEI DNA: NEGATIVE
CANDIDA RRNA VAG QL PROBE: NEGATIVE
T VAGINALIS RRNA GENITAL QL PROBE: NEGATIVE

## 2024-01-09 RX ORDER — METRONIDAZOLE 500 MG/1
500 TABLET ORAL EVERY 12 HOURS
Qty: 14 TABLET | Refills: 0 | Status: SHIPPED | OUTPATIENT
Start: 2024-01-09 | End: 2024-01-17

## 2024-01-09 RX ORDER — TERCONAZOLE 8 MG/G
1 CREAM VAGINAL DAILY
Qty: 20 G | Refills: 1 | Status: SHIPPED | OUTPATIENT
Start: 2024-01-09

## 2024-01-09 NOTE — PROGRESS NOTES
Patient's recent culture was positive for bacterial vaginosis and yeast.  Prescription for treatment of both have been sent to her pharmacy

## 2024-04-15 ENCOUNTER — PATIENT MESSAGE (OUTPATIENT)
Dept: GASTROENTEROLOGY | Facility: CLINIC | Age: 29
End: 2024-04-15
Payer: COMMERCIAL

## 2024-06-28 RX ORDER — FLUTICASONE PROPIONATE 50 MCG
2 SPRAY, SUSPENSION (ML) NASAL 2 TIMES DAILY
Qty: 48 G | Refills: 0 | Status: SHIPPED | OUTPATIENT
Start: 2024-06-28

## 2024-06-28 NOTE — TELEPHONE ENCOUNTER
Refill Routing Note   Medication(s) are not appropriate for processing by [facility] for the following reason(s):        Due for refill >6 months ago    [facility] action(s):  Defer               Appointments  past 12m or future 3m with PCP    Date Provider   Last Visit   8/4/2023 Radha Bailon MD   Next Visit   Visit date not found Radha Bailon MD   ED visits in past 90 days: 0        Note composed:10:06 AM 06/28/2024

## 2024-06-28 NOTE — TELEPHONE ENCOUNTER
No care due was identified.  [facility] Embedded Care Due Messages. Reference number: [id number].   6/27/2024 9:58:28 PM CDT

## 2024-08-26 DIAGNOSIS — Z01.419 ENCNTR FOR GYN EXAM (GENERAL) (ROUTINE) W/O ABN FINDINGS: ICD-10-CM

## 2024-08-26 RX ORDER — NORELGESTROMIN AND ETHINYL ESTRADIOL 35; 150 UG/MG; UG/MG
1 PATCH TRANSDERMAL WEEKLY
Qty: 4 PATCH | Refills: 6 | OUTPATIENT
Start: 2024-08-26

## 2024-09-03 DIAGNOSIS — Z01.419 ENCNTR FOR GYN EXAM (GENERAL) (ROUTINE) W/O ABN FINDINGS: ICD-10-CM

## 2024-09-03 RX ORDER — NORELGESTROMIN AND ETHINYL ESTRADIOL 35; 150 UG/MG; UG/MG
1 PATCH TRANSDERMAL WEEKLY
Qty: 4 PATCH | Refills: 6 | OUTPATIENT
Start: 2024-09-03

## 2024-09-09 DIAGNOSIS — Z01.419 ENCNTR FOR GYN EXAM (GENERAL) (ROUTINE) W/O ABN FINDINGS: ICD-10-CM

## 2024-09-09 NOTE — TELEPHONE ENCOUNTER
"Refill Routing Note   Medication(s) are not appropriate for processing by [facility] for the following reason(s):        Other( order was denied for " needs appt". LOV 12mths ago/ 1/2024 saw Dr. Woodruff for acute issue)    [facility] action(s):  Defer        Medication Therapy Plan: Denied twice in the last 2 weeks for " needs appt". LOV was 9/2023 ( <12mths) Recent OV with Dr. Woodruff for acute concern in January 2024.      Appointments  past 12m or future 3m with PCP    Date Provider   Last Visit   9/14/2023 Caryn Chaves MD   Next Visit   Visit date not found Caryn Chaves MD   ED visits in past 90 days: 0        Note composed:2:14 PM 09/09/2024          "

## 2024-09-10 RX ORDER — NORELGESTROMIN AND ETHINYL ESTRADIOL 35; 150 UG/MG; UG/MG
1 PATCH TRANSDERMAL WEEKLY
Qty: 9 PATCH | Refills: 0 | Status: SHIPPED | OUTPATIENT
Start: 2024-09-10

## 2024-09-20 NOTE — PLAN OF CARE
Problem: Postpartum (Vaginal Delivery) (Adult,Obstetrics,Pediatric)  Goal: Signs and Symptoms of Listed Potential Problems Will be Absent, Minimized or Managed (Postpartum)  Signs and symptoms of listed potential problems will be absent, minimized or managed by discharge/transition of care (reference Postpartum (Vaginal Delivery) (Adult,Obstetrics,Pediatric) [facility]).   Outcome: Ongoing (interventions implemented as appropriate)  Patient tolerating regular diet, naproxen working ok for back pain, doesn't want to try anything stronger at this time. Aware of medication available. Patient bonding with baby and family very supportive at bedside. Voiding without difficulty. POC discussed for nightly routine. No questions or concerns at this time.        12

## 2024-12-03 DIAGNOSIS — Z01.419 ENCNTR FOR GYN EXAM (GENERAL) (ROUTINE) W/O ABN FINDINGS: ICD-10-CM

## 2024-12-03 RX ORDER — NORELGESTROMIN AND ETHINYL ESTRADIOL 35; 150 UG/MG; UG/MG
1 PATCH TRANSDERMAL WEEKLY
Qty: 9 PATCH | Refills: 0 | Status: SHIPPED | OUTPATIENT
Start: 2024-12-03

## 2024-12-03 NOTE — TELEPHONE ENCOUNTER
Refill Decision Note   Romana Hilario  is requesting a refill authorization.  Brief Assessment and Rationale for Refill:  Approve     Medication Therapy Plan:         Comments:     Note composed:3:34 PM 12/03/2024

## 2025-01-09 ENCOUNTER — PATIENT OUTREACH (OUTPATIENT)
Dept: ADMINISTRATIVE | Facility: HOSPITAL | Age: 30
End: 2025-01-09
Payer: COMMERCIAL

## 2025-01-09 NOTE — PROGRESS NOTES
Health Maintenance Topic(s) Outreach Outcomes & Actions Taken:    Primary Care Appt - Outreach Outcomes & Actions Taken  : Primary Care Appt Scheduled

## 2025-02-20 ENCOUNTER — OFFICE VISIT (OUTPATIENT)
Dept: FAMILY MEDICINE | Facility: CLINIC | Age: 30
End: 2025-02-20
Payer: COMMERCIAL

## 2025-02-20 VITALS
TEMPERATURE: 98 F | SYSTOLIC BLOOD PRESSURE: 104 MMHG | RESPIRATION RATE: 16 BRPM | OXYGEN SATURATION: 99 % | BODY MASS INDEX: 19.2 KG/M2 | DIASTOLIC BLOOD PRESSURE: 64 MMHG | HEART RATE: 70 BPM | HEIGHT: 66 IN | WEIGHT: 119.5 LBS

## 2025-02-20 DIAGNOSIS — E78.00 PURE HYPERCHOLESTEROLEMIA: ICD-10-CM

## 2025-02-20 DIAGNOSIS — Z11.3 ROUTINE SCREENING FOR STI (SEXUALLY TRANSMITTED INFECTION): ICD-10-CM

## 2025-02-20 DIAGNOSIS — J00 ACUTE RHINITIS: ICD-10-CM

## 2025-02-20 DIAGNOSIS — Z00.00 ANNUAL PHYSICAL EXAM: Primary | ICD-10-CM

## 2025-02-20 DIAGNOSIS — F41.1 GAD (GENERALIZED ANXIETY DISORDER): ICD-10-CM

## 2025-02-20 DIAGNOSIS — Z01.419 ENCNTR FOR GYN EXAM (GENERAL) (ROUTINE) W/O ABN FINDINGS: ICD-10-CM

## 2025-02-20 RX ORDER — BUSPIRONE HYDROCHLORIDE 5 MG/1
5 TABLET ORAL 2 TIMES DAILY
Qty: 60 TABLET | Refills: 3 | Status: SHIPPED | OUTPATIENT
Start: 2025-02-20 | End: 2026-02-20

## 2025-02-20 RX ORDER — NORELGESTROMIN AND ETHINYL ESTRADIOL 35; 150 UG/MG; UG/MG
1 PATCH TRANSDERMAL WEEKLY
Qty: 9 PATCH | Refills: 0 | Status: SHIPPED | OUTPATIENT
Start: 2025-02-20

## 2025-02-20 NOTE — TELEPHONE ENCOUNTER
Refill Routing Note   Medication(s) are not appropriate for processing by [facility] for the following reason(s):        Patient not seen by provider within 15 months    [facility] action(s):  Defer               Appointments  past 12m or future 3m with PCP    Date Provider   Last Visit   9/14/2023 Caryn Chaves MD   Next Visit   Visit date not found Caryn Chaves MD   ED visits in past 90 days: 0        Note composed:12:00 PM 02/20/2025

## 2025-02-20 NOTE — PROGRESS NOTES
[facility] Note    Chief Complaint      Chief Complaint   Patient presents with    Annual Exam  Worsening anxiety      History of Present Illness      Yanelis Avendano ia a 28yo F with SATISH, impaired hearing and h/o recurrent UTI(ESBL) who presents for annual physical in addition with multiple complaints . Romana reports flu on Christmas Day, with a fever peaking at approximately 103°F. She was severely ill for 5 days, unable to participate in any Christmas activities. The flu progressed to bronchitis affecting her chest. Subsequently, she contracted a stomach virus from her children, followed by a sinus infection that caused her to lose her voice over the weekend. She notes improvement this week, though she coughed a little in the morning with some yellow sputum from deep in her respiratory tract. She describes these infections as occurring in rapid succession since Christmas, leading her to feel that her immune system is compromised.    She has been using OTC medications including Dayquil and Claritin. She also uses a nasal spray to aid nighttime breathing, which has been her primary issue. She reports significant work-related stress, which she believes is negatively impacting her immune system. She is understaffed at work and taking on numerous responsibilities, finding it challenging to decline tasks. This has resulted in poor sleep, which she acknowledges may be affecting her body's ability to recover.    She has a history of anxiety and ADHD. She has tried anxiety medications in the past, including Prozac, Zoloft, and Lexapro for both depression and anxiety. She is concerned about potential adverse reactions and effects on her libido, which she already finds challenging. She is unable to take Adderall or similar medications for her ADHD due to appetite suppression and feeling cognitively dulled.    She denies chest pain, shortness of breath, and wheezing. She states all symptoms are upper  respiratory.    MEDICATIONS:  - Dayquil, as needed for flu symptoms  - Claritin, daily for allergies/congestion  - Nasal spray, as needed for nighttime breathing  - Discontinued Prozac for anxiety/depression in the past  - Discontinued Zoloft for anxiety/depression in the past  - Discontinued Lexapro for anxiety/depression in the past    MEDICAL HISTORY:  - Flu: Deo Day  - Bronchitis: After flu  - Stomach virus: After bronchitis  - Sinus infection: Recent, resolved  - Last Pap: up to date    SOCIAL HISTORY:  - Occupation: Employed      ROS:  General: -fever, -chills, -fatigue, -weight gain, -weight loss  Eyes: -vision changes, -redness, -discharge  ENT: -ear pain, -nasal congestion, -sore throat  Cardiovascular: -chest pain, -palpitations, -lower extremity edema  Respiratory: +cough, -shortness of breath, -wheezing  Gastrointestinal: -abdominal pain, -nausea, -vomiting, -diarrhea, -constipation, -blood in stool  Genitourinary: -dysuria, -hematuria, -frequency  Musculoskeletal: -joint pain, -muscle pain  Skin: -rash, -lesion  Neurological: -headache, -dizziness, -numbness, -tingling  Psychiatric: -anxiety, -depression, -sleep difficulty          Romana Hilario is a 29 y.o. female who presents with:    Problem List Addressed This Visit:  1. Annual physical exam  -     Lipid Panel; Future; Expected date: 02/20/2025  -     TSH; Future; Expected date: 02/20/2025  -     Hemoglobin A1C; Future; Expected date: 02/20/2025  -     Comprehensive Metabolic Panel; Future; Expected date: 02/20/2025  -     CBC Auto Differential; Future; Expected date: 02/20/2025    2. Routine screening for STI (sexually transmitted infection)  -     C. trachomatis/N. gonorrhoeae by AMP DNA [facility]; Urine; Future; Expected date: 02/20/2025  -     Treponema Pallidium Antibodies IgG, IgM; Future; Expected date: 02/20/2025    3. SATISH (generalized anxiety disorder)  -     busPIRone (BUSPAR) 5 MG Tab; Take 1 tablet (5 mg total) by mouth 2 (two) times  "daily.  Dispense: 60 tablet; Refill: 3    4. Acute rhinitis    5. Pure hypercholesterolemia           Encounter Medications[1]     Review of patient's allergies indicates:  No Known Allergies    Physical Exam      Vital Signs  Temp: 98.1 °F (36.7 °C)  Temp Source: Temporal  Pulse: 70  Resp: 16  SpO2: 99 %  BP: 104/64  BP Location: Left arm  Patient Position: Sitting  Pain Score: 0-No pain  Height and Weight  Height: 5' 6" (167.6 cm)  Weight: 54.2 kg (119 lb 7.8 oz)  BSA (Calculated - sq m): 1.59 sq meters  BMI (Calculated): 19.3  Weight in (lb) to have BMI = 25: 154.6]    Physical Exam    General: No acute distress. Well-developed. Well-nourished.  Eyes: EOMI. Sclerae anicteric.  HENT: Normocephalic. Atraumatic. Nares patent. Moist oral mucosa. Post nasal drip.  Ears: Bilateral TMs clear. Bilateral EACs clear. Tiny wax in ears.  Cardiovascular: Regular rate. Regular rhythm. No murmurs. No rubs. No gallops. Normal S1, S2.  Respiratory: Normal respiratory effort. Clear to auscultation bilaterally. No rales. No rhonchi. No wheezing.  Abdomen: Soft. Non-tender. Non-distended. Normoactive bowel sounds.  Musculoskeletal: No  obvious deformity.  Extremities: No lower extremity edema.  Neurological: Alert & oriented x3. No slurred speech. Normal gait.  Psychiatric: Normal mood. Normal affect. Good insight. Good judgment.  Skin: Warm. Dry. No rash.          Laboratory:  CBC:  No results for input(s): "WBC", "RBC", "HGB", "HCT", "PLT", "MCV", "MCH", "MCHC" in the last 2160 hours.  CMP:  No results for input(s): "GLU", "CALCIUM", "ALBUMIN", "PROT", "NA", "K", "CO2", "CL", "BUN", "ALKPHOS", "ALT", "AST", "BILITOT" in the last 2160 hours.    Invalid input(s): "CREATININ"  URINALYSIS:  No results for input(s): "COLORU", "CLARITYU", "SPECGRAV", "PHUR", "PROTEINUA", "GLUCOSEU", "BILIRUBINCON", "BLOODU", "WBCU", "RBCU", "BACTERIA", "MUCUS", "NITRITE", "LEUKOCYTESUR", "UROBILINOGEN", "HYALINECASTS" in the last 2160 hours. " "  LIPIDS:  No results for input(s): "TSH", "HDL", "CHOL", "TRIG", "LDLCALC", "CHOLHDL", "NONHDLCHOL", "TOTALCHOLEST" in the last 2160 hours.  TSH:  No results for input(s): "TSH" in the last 2160 hours.  A1C:  No results for input(s): "HGBA1C" in the last 2160 hours.    Radiology:      Assessment/Plan     Romana Hilario is a 29 y.o.female with:    1. Annual physical exam  - Lipid Panel; Future  - TSH; Future  - Hemoglobin A1C; Future  - Comprehensive Metabolic Panel; Future  - CBC Auto Differential; Future    2. Routine screening for STI (sexually transmitted infection)  - C. trachomatis/N. gonorrhoeae by AMP DNA [facility]; Urine; Future  - Treponema Pallidium Antibodies IgG, IgM; Future    3. SATISH (generalized anxiety disorder)  - busPIRone (BUSPAR) 5 MG Tab; Take 1 tablet (5 mg total) by mouth 2 (two) times daily.  Dispense: 60 tablet; Refill: 3    4. Acute rhinitis    5. Pure hypercholesterolemia    Assessment & Plan      - Assessed the patient's stress levels and determined the need for anxiety medication.  - Prescribed buspirone to be taken twice daily for anxiety management.  - Educated the patient about the impact of stress on immune function and overall health.  - Emphasized the importance of mental health.  -will refer to psych if no improvement   - Advised continuation of OTC Claritin for allergy symptoms and nasal congestion.  - Recommend continued use of nasal spray for nighttime breathing.    ANNUAL PHYSICALS  -preventive counseling provided  -labs due ordered   -UTD on cervical cancer screening       -Continue current medications and maintain follow up with specialists.      Patient verbalizes understanding and agrees with current treatment plan.    This note was generated with the assistance of ambient listening technology. I attest to having reviewed and edited the generated note for accuracy, though some syntax or spelling errors may persist. Please contact the author of this note for any " clarification.         Radha Bailon MD  Internal Medicine   [facility] [place]                       [1]   Outpatient Encounter Medications as of 2/20/2025   Medication Sig Dispense Refill    doxycycline (VIBRAMYCIN) 100 MG Cap Take 1 capsule (oral) 2 times per day for 10 days 20 capsule 0    fluconazole (DIFLUCAN) 200 MG Tab Take 1 tablet (oral) daily for 1 days May repeat in one week if necessary. 2 tablet 0    fluticasone propionate (FLONASE) 50 mcg/actuation nasal spray Instill 2 sprays (100 mcg total) by Each Nostril route 2 (two) times a day. 48 g 0    norelgestromin-ethinyl estradiol (XULANE) 150-35 mcg/24 hr Place 1 patch onto the skin each week for 3 weeks; Week 4 is patch-free; Repeat. 9 patch 0    predniSONE (DELTASONE) 10 MG tablet Take 1 tablet (oral) 2 times per day for 5 days 10 tablet 0    terconazole (TERAZOL 3) 0.8 % vaginal cream Place 1 applicatorful vaginally once daily at bedtime for 3 days 20 g 1    albuterol (PROVENTIL/VENTOLIN HFA) 90 mcg/actuation inhaler Take 1 puff (inhalation) every 4 to 6 hours (as needed for shortness of breath or wheezing) (Patient not taking: Reported on 2/20/2025) 8.5 g 0    busPIRone (BUSPAR) 5 MG Tab Take 1 tablet (5 mg total) by mouth 2 (two) times daily. 60 tablet 3    dicyclomine (BENTYL) 10 MG capsule Take 1 capsule (10 mg total) by mouth 3 (three) times daily as needed (abdominal pain). (Patient not taking: Reported on 2/20/2025) 60 capsule 2    [DISCONTINUED] amoxicillin (AMOXIL) 500 MG capsule Take 1 capsule (500 mg total) by mouth 2 (two) times a day for 7 days. (Patient not taking: Reported on 2/20/2025) 14 capsule 0    [DISCONTINUED] levocetirizine (XYZAL) 5 MG tablet Take 1 tablet (5 mg total) by mouth every evening. (Patient not taking: Reported on 9/14/2023) 90 tablet 3     No facility-administered encounter medications on file as of 2/20/2025.

## 2025-02-20 NOTE — LETTER
February 20, 2025      [facility]  [address]  [address]  [facility] [address]  Phone: [phone]  Fax: [phone]       Patient: Romana Hilario   YOB: 1995  Date of Visit: 02/20/2025    To Whom It May Concern:    Gerardo Hilario  was at [facility] on 02/20/2025. The patient may return to work on 02/21/2025 with no restrictions. If you have any questions or concerns, or if I can be of further assistance, please do not hesitate to contact me.    Sincerely,          Janet Mcmillan MA

## 2025-02-21 ENCOUNTER — RESULTS FOLLOW-UP (OUTPATIENT)
Dept: FAMILY MEDICINE | Facility: CLINIC | Age: 30
End: 2025-02-21

## 2025-04-14 ENCOUNTER — OFFICE VISIT (OUTPATIENT)
Dept: ORTHOPEDICS | Facility: CLINIC | Age: 30
End: 2025-04-14
Payer: COMMERCIAL

## 2025-04-14 ENCOUNTER — HOSPITAL ENCOUNTER (OUTPATIENT)
Dept: RADIOLOGY | Facility: HOSPITAL | Age: 30
Discharge: HOME | End: 2025-04-14
Attending: PHYSICIAN ASSISTANT
Payer: COMMERCIAL

## 2025-04-14 VITALS — WEIGHT: 118.63 LBS | HEIGHT: 66 IN | BODY MASS INDEX: 19.07 KG/M2

## 2025-04-14 DIAGNOSIS — M54.12 CERVICAL RADICULOPATHY: Primary | ICD-10-CM

## 2025-04-14 DIAGNOSIS — M50.30 DDD (DEGENERATIVE DISC DISEASE), CERVICAL: Primary | ICD-10-CM

## 2025-04-14 DIAGNOSIS — M50.30 DDD (DEGENERATIVE DISC DISEASE), CERVICAL: ICD-10-CM

## 2025-04-14 PROCEDURE — 99999 PR PBB SHADOW E&M-EST. PATIENT-LVL III: CPT | Mod: PBBFAC,,, | Performed by: PHYSICIAN ASSISTANT

## 2025-04-14 PROCEDURE — 1159F MED LIST DOCD IN RCRD: CPT | Mod: CPTII,S$GLB,, | Performed by: PHYSICIAN ASSISTANT

## 2025-04-14 PROCEDURE — 99204 OFFICE O/P NEW MOD 45 MIN: CPT | Mod: S$GLB,,, | Performed by: PHYSICIAN ASSISTANT

## 2025-04-14 PROCEDURE — 3008F BODY MASS INDEX DOCD: CPT | Mod: CPTII,S$GLB,, | Performed by: PHYSICIAN ASSISTANT

## 2025-04-14 PROCEDURE — 72050 X-RAY EXAM NECK SPINE 4/5VWS: CPT | Mod: 26,,, | Performed by: RADIOLOGY

## 2025-04-14 PROCEDURE — 3044F HG A1C LEVEL LT 7.0%: CPT | Mod: CPTII,S$GLB,, | Performed by: PHYSICIAN ASSISTANT

## 2025-04-14 PROCEDURE — 72050 X-RAY EXAM NECK SPINE 4/5VWS: CPT | Mod: TC

## 2025-04-14 RX ORDER — METHYLPREDNISOLONE 4 MG/1
TABLET ORAL
Qty: 21 EACH | Refills: 0 | Status: SHIPPED | OUTPATIENT
Start: 2025-04-14

## 2025-04-14 RX ORDER — METHOCARBAMOL 750 MG/1
750 TABLET, FILM COATED ORAL 3 TIMES DAILY
Qty: 60 TABLET | Refills: 0 | Status: SHIPPED | OUTPATIENT
Start: 2025-04-14 | End: 2025-05-05

## 2025-04-14 RX ORDER — DIAZEPAM 2 MG/1
2 TABLET ORAL
Qty: 2 TABLET | Refills: 0 | Status: SHIPPED | OUTPATIENT
Start: 2025-04-14 | End: 2025-05-15

## 2025-04-14 NOTE — PROGRESS NOTES
DATE: 4/14/2025  PATIENT: Romana Hilario    Supervising Physician: Jeremiah Reddy M.D.    CHIEF COMPLAINT: neck and left arm pain    HISTORY:  Romana Hilario is a 29 y.o. female here for initial evaluation of neck and left arm pain (Neck - 8, Arm - 8). The pain has been present for several years but has progressively worsened over the last several months and even worse just last night. The patient describes the pain as aching and pressure. The pain is worse with extension and lying down and improved by using a cervical pillow and heat. There is associated numbness and tingling. There is subjective weakness. Prior treatments have included aleve and a chiropractor, but no PT, ESIs or surgery.     The patient denies myelopathic symptoms such as handwriting changes or difficulty with buttons/coins/keys. Denies perineal paresthesias, bowel/bladder dysfunction.    PAST MEDICAL/SURGICAL HISTORY:  Past Medical History:   Diagnosis Date    Acute pyelonephritis in second trimester, antepartum     Depression     no meds    Recurrent UTI (urinary tract infection)      Past Surgical History:   Procedure Laterality Date    WISDOM TOOTH EXTRACTION         Medications:  Medications Ordered Prior to Encounter[1]    Social History: Social History[2]    REVIEW OF SYSTEMS:  Constitution: Negative. Negative for chills, fever and night sweats.   Cardiovascular: Negative for chest pain and syncope.   Respiratory: Negative for cough and shortness of breath.   Gastrointestinal: See HPI. Negative for nausea/vomiting. Negative for abdominal pain.  Genitourinary: See HPI. Negative for discoloration or dysuria.  Skin: Negative for dry skin, itching and rash.   Hematologic/Lymphatic: Negative for bleeding problem. Does not bruise/bleed easily.   Musculoskeletal: Negative for falls and muscle weakness.   Neurological: See HPI. No seizures.   Endocrine: Negative for polydipsia, polyphagia and polyuria.   Allergic/Immunologic: Negative for hives and  "persistent infections.  Psychiatric/Behavioral: Negative for depression and insomnia.         EXAM:  Ht 5' 6" (1.676 m)   Wt 53.8 kg (118 lb 9.7 oz)   BMI 19.14 kg/m²     General: The patient is a very pleasant 29 y.o. female in no apparent distress, the patient is oriented to person, place and time.  Psych: Normal mood and affect  HEENT: Vision grossly intact, hearing intact to the spoken word.  Lungs: Respirations unlabored.  Gait: Normal station and gait, no difficulty with toe or heel walk.   Skin: Cervical skin negative for rashes, lesions, hairy patches and surgical scars.  Range of motion: Cervical range of motion is acceptable. There is mild tenderness to palpation.  Spinal Balance: Global saggital and coronal spinal balance acceptable, no significant for scoliosis and kyphosis.  Musculoskeletal: No pain with the range of motion of the bilateral shoulders and elbows. Normal bulk and contour of the bilateral hands.  Vascular: Bilateral hands warm and well perfused, radial pulses 2+ bilaterally.  Neurological: Normal strength and tone in all major motor groups in the bilateral upper and lower extremities. Normal sensation to light touch in the C5-T1 and L2-S1 dermatomes bilaterally with the exception of decreased sensation in the left upper extremity.  Deep tendon reflexes symmetric 2+ in the bilateral upper and lower extremities.  Negative Inverted Radial Reflex and Baird's bilaterally. Negative Babinski bilaterally.     IMAGING:   Today I personally reviewed AP, Lat and Flex/Ex  upright C-spine films that demonstrate normal disc spacing and alignment. No acute abnormalities.     Body mass index is 19.14 kg/m².    Hemoglobin A1C   Date Value Ref Range Status   02/20/2025 5.1 4.0 - 5.6 % Final     Comment:     ADA Screening Guidelines:  5.7-6.4%  Consistent with prediabetes  >or=6.5%  Consistent with diabetes    High levels of fetal hemoglobin interfere with the HbA1C  assay. Heterozygous hemoglobin " variants (HbS, HgC, etc)do  not significantly interfere with this assay.   However, presence of multiple variants may affect accuracy.     09/09/2022 4.9 4.0 - 5.6 % Final     Comment:     ADA Screening Guidelines:  5.7-6.4%  Consistent with prediabetes  >or=6.5%  Consistent with diabetes    High levels of fetal hemoglobin interfere with the HbA1C  assay. Heterozygous hemoglobin variants (HbS, HgC, etc)do  not significantly interfere with this assay.   However, presence of multiple variants may affect accuracy.             ASSESSMENT/PLAN:    Diagnoses and all orders for this visit:    Cervical radiculopathy  -     MRI Cervical Spine Without Contrast; Future    Other orders  -     methylPREDNISolone (MEDROL DOSEPACK) 4 mg tablet; use as directed  -     methocarbamoL (ROBAXIN) 750 MG Tab; Take 1 tablet (750 mg total) by mouth 3 (three) times daily. As needed for muscle spasms for 60 doses        Today we discussed at length all of the different treatment options including anti-inflammatories, acetaminophen, rest, ice, heat, physical therapy including strengthening and stretching exercises, home exercises, ROM, aerobic conditioning, aqua therapy, other modalities including ultrasound, massage, and dry needling, epidural steroid injections and finally surgical intervention.      The patient has failed conservative treatment including medications and a chiropractor. I will get an MRI. I will call with results.                [1]   Current Outpatient Medications on File Prior to Visit   Medication Sig Dispense Refill    busPIRone (BUSPAR) 5 MG Tab Take 1 tablet (5 mg total) by mouth 2 (two) times daily. 60 tablet 3    doxycycline (VIBRAMYCIN) 100 MG Cap Take 1 capsule (oral) 2 times per day for 10 days 20 capsule 0    fluconazole (DIFLUCAN) 200 MG Tab Take 1 tablet (oral) daily for 1 days May repeat in one week if necessary. 2 tablet 0    fluticasone propionate (FLONASE) 50 mcg/actuation nasal spray Instill 2 sprays (100  mcg total) by Each Nostril route 2 (two) times a day. 48 g 0    norelgestromin-ethinyl estradiol (XULANE) 150-35 mcg/24 hr Place 1 patch onto the skin each week for 3 weeks; Week 4 is patch-free; Repeat. 9 patch 0    predniSONE (DELTASONE) 10 MG tablet Take 1 tablet (oral) 2 times per day for 5 days 10 tablet 0    terconazole (TERAZOL 3) 0.8 % vaginal cream Place 1 applicatorful vaginally once daily at bedtime for 3 days 20 g 1    [DISCONTINUED] albuterol (PROVENTIL/VENTOLIN HFA) 90 mcg/actuation inhaler Take 1 puff (inhalation) every 4 to 6 hours (as needed for shortness of breath or wheezing) (Patient not taking: Reported on 2/20/2025) 8.5 g 0    [DISCONTINUED] dicyclomine (BENTYL) 10 MG capsule Take 1 capsule (10 mg total) by mouth 3 (three) times daily as needed (abdominal pain). (Patient not taking: Reported on 2/20/2025) 60 capsule 2     No current facility-administered medications on file prior to visit.   [2]   Social History  Socioeconomic History    Marital status: Single   Tobacco Use    Smoking status: Never     Passive exposure: Yes    Smokeless tobacco: Never   Substance and Sexual Activity    Alcohol use: Not Currently     Comment: social    Drug use: Never    Sexual activity: Yes     Partners: Male     Birth control/protection: Patch     Social Drivers of Health     Financial Resource Strain: Medium Risk (2/20/2025)    Overall Financial Resource Strain (CARDIA)     Difficulty of Paying Living Expenses: Somewhat hard   Food Insecurity: No Food Insecurity (2/20/2025)    Hunger Vital Sign     Worried About Running Out of Food in the Last Year: Never true     Ran Out of Food in the Last Year: Never true   Transportation Needs: No Transportation Needs (2/20/2025)    PRAPARE - Transportation     Lack of Transportation (Medical): No     Lack of Transportation (Non-Medical): No   Physical Activity: Sufficiently Active (2/20/2025)    Exercise Vital Sign     Days of Exercise per Week: 3 days     Minutes of  Exercise per Session: 60 min   Stress: Stress Concern Present (2/20/2025)    [employer] - Occupational Stress Questionnaire     Feeling of Stress : Rather much   Housing Stability: Low Risk  (2/20/2025)    Housing Stability Vital Sign     Unable to Pay for Housing in the Last Year: No     Number of Times Moved in the Last Year: 0     Homeless in the Last Year: No

## 2025-04-22 ENCOUNTER — OFFICE VISIT (OUTPATIENT)
Dept: ORTHOPEDICS | Facility: CLINIC | Age: 30
End: 2025-04-22
Payer: COMMERCIAL

## 2025-04-22 DIAGNOSIS — M54.12 CERVICAL RADICULOPATHY: Primary | ICD-10-CM

## 2025-04-22 NOTE — PROGRESS NOTES
The patient location is: [place]  The chief complaint leading to consultation is: MRI results    Visit type: audiovisual    Face to Face time with patient: 5 minutes  10 minutes of total time spent on the encounter, which includes face to face time and non-face to face time preparing to see the patient (eg, review of tests), Obtaining and/or reviewing separately obtained history, Documenting clinical information in the electronic or other health record, Independently interpreting results (not separately reported) and communicating results to the patient/family/caregiver, or Care coordination (not separately reported).         Each patient to whom he or she provides medical services by telemedicine is:  (1) informed of the relationship between the physician and patient and the respective role of any other health care provider with respect to management of the patient; and (2) notified that he or she may decline to receive medical services by telemedicine and may withdraw from such care at any time.    Notes:   The patient presents for MRI results. She says her pain has improved.      MRI cervical spine demonstrates no spinal canal or foraminal stenosis.    A/P:  Referral for PT placed today.  Follow up as needed.

## 2025-05-06 PROBLEM — R68.89 IMPAIRED TOLERANCE OF ACTIVITY: Status: ACTIVE | Noted: 2025-05-06

## 2025-05-06 PROBLEM — M54.2 PAINFUL CERVICAL RANGE OF MOTION: Status: ACTIVE | Noted: 2025-05-06

## 2025-05-15 DIAGNOSIS — Z01.419 ENCNTR FOR GYN EXAM (GENERAL) (ROUTINE) W/O ABN FINDINGS: ICD-10-CM

## 2025-05-15 NOTE — TELEPHONE ENCOUNTER
Refill Routing Note   Medication(s) are not appropriate for processing by [facility] for the following reason(s):        Patient not seen by provider within 15 months    [facility] action(s):  Defer        Medication Therapy Plan: LOV with 1 OBGYN was w/i 24mths but LOV with the last provider was >15mths. Must defer      Appointments  past 12m or future 3m with PCP    Date Provider   Last Visit   9/14/2023 Caryn Chaves MD   Next Visit   Visit date not found Caryn Chaves MD   ED visits in past 90 days: 0        Note composed:12:24 PM 05/15/2025

## 2025-05-16 RX ORDER — NORELGESTROMIN AND ETHINYL ESTRADIOL 35; 150 UG/MG; UG/MG
1 PATCH TRANSDERMAL WEEKLY
Qty: 9 PATCH | Refills: 0 | OUTPATIENT
Start: 2025-05-16

## 2025-05-16 NOTE — TELEPHONE ENCOUNTER
Provider Staff:  Please note Refusal of medication.     Action required for this patient.      Requested Prescriptions     Refused Prescriptions Disp Refills    norelgestromin-ethinyl estradiol (XULANE) 150-35 mcg/24 hr 9 patch 0     Sig: Place 1 patch onto the skin each week for 3 weeks; Week 4 is patch-free; Repeat.     Refused By: PREM ROSE     Reason for Refusal: Patient needs an appointment      Thanks!  [facility]   Note composed: 05/16/2025 11:47 AM

## 2025-05-27 DIAGNOSIS — Z01.419 ENCNTR FOR GYN EXAM (GENERAL) (ROUTINE) W/O ABN FINDINGS: ICD-10-CM

## 2025-05-27 RX ORDER — NORELGESTROMIN AND ETHINYL ESTRADIOL 35; 150 UG/MG; UG/MG
1 PATCH TRANSDERMAL WEEKLY
Qty: 9 PATCH | Refills: 0 | OUTPATIENT
Start: 2025-05-27

## 2025-05-27 NOTE — TELEPHONE ENCOUNTER
Refill Decision Note  Shirley DC. Previously Denied    Romana Hilario  is requesting a refill authorization.  Brief Assessment and Rationale for Refill:  Quick Discontinue     Medication Therapy Plan:  Left message for pharmacy pt overdue for appointment and to schedule with the office.    Medication Reconciliation Completed: Yes   Comments:           Note composed:10:15 AM 05/27/2025

## 2025-06-03 DIAGNOSIS — Z01.419 ENCNTR FOR GYN EXAM (GENERAL) (ROUTINE) W/O ABN FINDINGS: ICD-10-CM

## 2025-06-03 RX ORDER — NORELGESTROMIN AND ETHINYL ESTRADIOL 35; 150 UG/MG; UG/MG
1 PATCH TRANSDERMAL WEEKLY
Qty: 9 PATCH | Refills: 0 | OUTPATIENT
Start: 2025-06-03

## 2025-06-10 ENCOUNTER — OFFICE VISIT (OUTPATIENT)
Facility: CLINIC | Age: 30
End: 2025-06-10
Payer: COMMERCIAL

## 2025-06-10 VITALS
BODY MASS INDEX: 18.85 KG/M2 | DIASTOLIC BLOOD PRESSURE: 76 MMHG | HEART RATE: 98 BPM | WEIGHT: 117.31 LBS | SYSTOLIC BLOOD PRESSURE: 110 MMHG | HEIGHT: 66 IN

## 2025-06-10 DIAGNOSIS — Z01.419 ENCNTR FOR GYN EXAM (GENERAL) (ROUTINE) W/O ABN FINDINGS: ICD-10-CM

## 2025-06-10 PROCEDURE — 99395 PREV VISIT EST AGE 18-39: CPT | Mod: S$GLB,,, | Performed by: STUDENT IN AN ORGANIZED HEALTH CARE EDUCATION/TRAINING PROGRAM

## 2025-06-10 PROCEDURE — 1160F RVW MEDS BY RX/DR IN RCRD: CPT | Mod: CPTII,S$GLB,, | Performed by: STUDENT IN AN ORGANIZED HEALTH CARE EDUCATION/TRAINING PROGRAM

## 2025-06-10 PROCEDURE — 3078F DIAST BP <80 MM HG: CPT | Mod: CPTII,S$GLB,, | Performed by: STUDENT IN AN ORGANIZED HEALTH CARE EDUCATION/TRAINING PROGRAM

## 2025-06-10 PROCEDURE — 3044F HG A1C LEVEL LT 7.0%: CPT | Mod: CPTII,S$GLB,, | Performed by: STUDENT IN AN ORGANIZED HEALTH CARE EDUCATION/TRAINING PROGRAM

## 2025-06-10 PROCEDURE — 99999 PR PBB SHADOW E&M-EST. PATIENT-LVL III: CPT | Mod: PBBFAC,,, | Performed by: STUDENT IN AN ORGANIZED HEALTH CARE EDUCATION/TRAINING PROGRAM

## 2025-06-10 PROCEDURE — 3074F SYST BP LT 130 MM HG: CPT | Mod: CPTII,S$GLB,, | Performed by: STUDENT IN AN ORGANIZED HEALTH CARE EDUCATION/TRAINING PROGRAM

## 2025-06-10 PROCEDURE — 3008F BODY MASS INDEX DOCD: CPT | Mod: CPTII,S$GLB,, | Performed by: STUDENT IN AN ORGANIZED HEALTH CARE EDUCATION/TRAINING PROGRAM

## 2025-06-10 PROCEDURE — 1159F MED LIST DOCD IN RCRD: CPT | Mod: CPTII,S$GLB,, | Performed by: STUDENT IN AN ORGANIZED HEALTH CARE EDUCATION/TRAINING PROGRAM

## 2025-06-10 RX ORDER — NORELGESTROMIN AND ETHINYL ESTRADIOL 35; 150 UG/MG; UG/MG
1 PATCH TRANSDERMAL WEEKLY
Qty: 9 PATCH | Refills: 4 | Status: SHIPPED | OUTPATIENT
Start: 2025-06-10

## 2025-06-10 NOTE — PROGRESS NOTES
Subjective:    Patient ID: Romana Hilario is a 29 y.o. y.o. female.     Chief Complaint: Annual Well Woman Exam     History of Present Illness:  Romana presents today for Annual Well Woman exam. She describes her menses as absent since removing her patch 3 weeks ago.She denies pelvic pain.  She denies breast tenderness, masses, nipple discharge. She admits to difficulty with  bowel movements- constipation. . She is sexually active. Contraception is by [facility].      Menstrual History:   Patient's last menstrual period was 2025 (exact date)..     OB History          2    Para   2    Term   2       0    AB   0    Living   2         SAB   0    IAB   0    Ectopic   0    Multiple   0    Live Births   2                 The following portions of the patient's history were reviewed and updated as appropriate: allergies, current medications, past family history, past medical history, past social history, past surgical history, and problem list.    ROS:   CONSTITUTIONAL: Negative for fever, chills, diaphoresis, weakness, fatigue, weight loss, weight gain  ENT: negative for sore throat, nasal congestion, nasal discharge, epistaxis, tinnitus, hearing loss  EYES: negative for blurry vision, decreased vision, loss of vision, eye pain, diplopia, photophobia, discharge  SKIN: Negative for rash, itching, hives  RESPIRATORY: negative for cough, hemoptysis, shortness of breath, pleuritic chest pain, wheezing  CARDIOVASCULAR: negative for chest pain, dyspnea on exertion, orthopnea, paroxysmal nocturnal dyspnea, edema, palpitations  BREAST: negative for breast  tenderness, breast mass, nipple discharge, or skin changes  GASTROINTESTINAL: constipation  GENITOURINARY: negative for abnormal vaginal bleeding, amenorrhea, decreased libido, dysuria, genital sores, hematuria, incontinence, menorrhagia, pelvic pain, urinary frequency, vaginal discharge  HEMATOLOGIC/LYMPHATIC: negative for swollen lymph nodes, bleeding,  bruising  MUSCULOSKELETAL: negative for back pain, joint pain, joint stiffness, joint swelling, muscle pain, muscle weakness  NEUROLOGICAL: negative for dizzy/vertigo, headache, focal weakness, numbness/tingling, speech problems, loss of consciousness, confusion, memory loss  BEHAVORIAL/PSYCH: negative for anxiety, depression, psychosis  ENDOCRINE: negative for polydipsia/polyuria, palpitations, skin changes, temperature intolerance, unexpected weight changes  ALLERGIC/IMMUNOLOGIC: negative for urticaria, hay fever, angioedema      Objective:    Vital Signs:  Vitals:    06/10/25 1500   BP: 110/76   Pulse: 98       Physical Exam:  General:  alert, cooperative, no distress   Skin:  Skin color, texture, turgor normal. No rashes or lesions   HEENT:  conjunctivae/corneas clear. PERRL.   Neck: supple, trachea midline, no adenopathy or thyromegally   Respiratory:  Normal effort   Breasts:  no discharge, erythema, tenderness, or palpable masses; no axillary lymphadenopathy   Abdomen:  soft, nontender, no palpable masses   Pelvis: External genitalia: normal general appearance  Urinary system: urethral meatus normal, bladder nontender  Vaginal: normal mucosa without prolapse or lesions  Cervix: normal appearance  Uterus: normal size, shape, position  Adnexa: normal size, nontender bilaterally   Extremities: Normal ROM; no edema, no cyanosis   Neurologial: Normal strength and tone. No focal numbness or weakness.   Psychiatric: normal mood, speech, dress, and thought processes     LABS:  3/4/2021     A1C:  Recent Labs   Lab 02/20/25  0936   Hemoglobin A1C 5.1     CBC:  Recent Labs   Lab 09/09/22  1227 02/20/25  0936   WBC 9.73 6.99   RBC 4.55 4.53   Hemoglobin 14.1 13.7   Hematocrit 41.1 41.1   Platelets 268 299   MCV 90 91   MCH 31.0 30.2   MCHC 34.3 33.3     CMP:  Recent Labs   Lab 09/09/22  1227 02/20/25  0936   Glucose 88 89   Calcium 9.1 8.4 L   Albumin 5.0 3.8   Total Protein 8.3 7.6   Sodium 141 141   Potassium 3.8 4.1    CO2 27 23   Chloride 100 108   BUN 12 10   Creatinine 0.77 0.7   Alkaline Phosphatase 54 47   ALT 18 12   AST 23 14   Total Bilirubin 0.6 0.3     LIPIDS:  Recent Labs   Lab 09/09/22  1227 02/20/25  0936   TSH 1.370 1.023   HDL 61 59   Cholesterol 217 H 210 H   Triglycerides 162 H 81   LDL Cholesterol 123.6 134.8   HDL/Cholesterol Ratio 28.1 28.1   Non-HDL Cholesterol 156 151   Total Cholesterol/HDL Ratio 3.6 3.6     TSH:  Recent Labs   Lab 09/09/22  1227 02/20/25  0936   TSH 1.370 1.023       Assessment:       Healthy female exam.     1. Encntr for gyn exam (general) (routine) w/o abn findings          Plan:      Problem List Items Addressed This Visit    None  Visit Diagnoses         Encntr for gyn exam (general) (routine) w/o abn findings        Relevant Medications    norelgestromin-ethinyl estradiol (XULANE) 150-35 mcg/24 hr            COUNSELING:  Romana was counseled on STD prevention, use and side-effects of various contraceptive measures, A.C.O.G. Pap guidelines and recommendations for yearly pelvic exams in addition to recommendations for monthly self breast exams; to see her PCP for other health maintenance.